# Patient Record
Sex: FEMALE | Race: WHITE | NOT HISPANIC OR LATINO | Employment: FULL TIME | URBAN - METROPOLITAN AREA
[De-identification: names, ages, dates, MRNs, and addresses within clinical notes are randomized per-mention and may not be internally consistent; named-entity substitution may affect disease eponyms.]

---

## 2020-02-14 ENCOUNTER — APPOINTMENT (OUTPATIENT)
Dept: RADIOLOGY | Facility: MEDICAL CENTER | Age: 40
End: 2020-02-14
Attending: EMERGENCY MEDICINE
Payer: COMMERCIAL

## 2020-02-14 ENCOUNTER — HOSPITAL ENCOUNTER (EMERGENCY)
Facility: MEDICAL CENTER | Age: 40
End: 2020-02-14
Attending: EMERGENCY MEDICINE
Payer: COMMERCIAL

## 2020-02-14 VITALS
RESPIRATION RATE: 16 BRPM | DIASTOLIC BLOOD PRESSURE: 79 MMHG | BODY MASS INDEX: 28.6 KG/M2 | HEART RATE: 79 BPM | SYSTOLIC BLOOD PRESSURE: 108 MMHG | WEIGHT: 155.42 LBS | HEIGHT: 62 IN | TEMPERATURE: 98.9 F | OXYGEN SATURATION: 100 %

## 2020-02-14 DIAGNOSIS — S61.217D LACERATION OF LEFT LITTLE FINGER WITHOUT FOREIGN BODY WITHOUT DAMAGE TO NAIL, SUBSEQUENT ENCOUNTER: ICD-10-CM

## 2020-02-14 PROCEDURE — 99283 EMERGENCY DEPT VISIT LOW MDM: CPT

## 2020-02-14 PROCEDURE — 90715 TDAP VACCINE 7 YRS/> IM: CPT | Performed by: EMERGENCY MEDICINE

## 2020-02-14 PROCEDURE — 90471 IMMUNIZATION ADMIN: CPT

## 2020-02-14 PROCEDURE — 304999 HCHG REPAIR-SIMPLE/INTERMED LEVEL 1

## 2020-02-14 PROCEDURE — 303747 HCHG EXTRA SUTURE

## 2020-02-14 PROCEDURE — 73130 X-RAY EXAM OF HAND: CPT | Mod: LT

## 2020-02-14 PROCEDURE — 304217 HCHG IRRIGATION SYSTEM

## 2020-02-14 PROCEDURE — 700111 HCHG RX REV CODE 636 W/ 250 OVERRIDE (IP): Performed by: EMERGENCY MEDICINE

## 2020-02-14 RX ORDER — BUPIVACAINE HYDROCHLORIDE 2.5 MG/ML
10 INJECTION, SOLUTION EPIDURAL; INFILTRATION; INTRACAUDAL ONCE
Status: COMPLETED | OUTPATIENT
Start: 2020-02-14 | End: 2020-02-14

## 2020-02-14 RX ADMIN — BUPIVACAINE HYDROCHLORIDE 10 ML: 2.5 INJECTION, SOLUTION EPIDURAL; INFILTRATION; INTRACAUDAL; PERINEURAL at 15:15

## 2020-02-14 RX ADMIN — CLOSTRIDIUM TETANI TOXOID ANTIGEN (FORMALDEHYDE INACTIVATED), CORYNEBACTERIUM DIPHTHERIAE TOXOID ANTIGEN (FORMALDEHYDE INACTIVATED), BORDETELLA PERTUSSIS TOXOID ANTIGEN (GLUTARALDEHYDE INACTIVATED), BORDETELLA PERTUSSIS FILAMENTOUS HEMAGGLUTININ ANTIGEN (FORMALDEHYDE INACTIVATED), BORDETELLA PERTUSSIS PERTACTIN ANTIGEN, AND BORDETELLA PERTUSSIS FIMBRIAE 2/3 ANTIGEN 0.5 ML: 5; 2; 2.5; 5; 3; 5 INJECTION, SUSPENSION INTRAMUSCULAR at 15:06

## 2020-02-14 NOTE — ED PROVIDER NOTES
ED Provider Note    CHIEF COMPLAINT  Chief Complaint   Patient presents with   • Laceration     pt was doing dishes around 0830 and glass broke lacerating 2 fingers       HPI  Alisha Negrete is a 39 y.o. female who presents for evaluation of accidental laceration to the lateral aspect of her left second and third digits.  The patient was apparently cleaning some dishes at home.  She got glass and excellently cut her fingers.  This was around 4 hours ago.  Her tetanus is not up-to-date.  She is not a diabetic no other complaints.  REVIEW OF SYSTEMS  See HPI for further details.  No numbness weakness tingling fevers chills all other systems are negative.     PAST MEDICAL HISTORY  No past medical history on file.    FAMILY HISTORY  Noncontributory    SOCIAL HISTORY  Social History     Socioeconomic History   • Marital status: Legally      Spouse name: Not on file   • Number of children: Not on file   • Years of education: Not on file   • Highest education level: Not on file   Occupational History   • Not on file   Social Needs   • Financial resource strain: Not on file   • Food insecurity     Worry: Not on file     Inability: Not on file   • Transportation needs     Medical: Not on file     Non-medical: Not on file   Tobacco Use   • Smoking status: Current Every Day Smoker   • Smokeless tobacco: Never Used   Substance and Sexual Activity   • Alcohol use: Yes     Comment: rarely   • Drug use: Yes     Comment: marijuana   • Sexual activity: Not on file   Lifestyle   • Physical activity     Days per week: Not on file     Minutes per session: Not on file   • Stress: Not on file   Relationships   • Social connections     Talks on phone: Not on file     Gets together: Not on file     Attends Orthodoxy service: Not on file     Active member of club or organization: Not on file     Attends meetings of clubs or organizations: Not on file     Relationship status: Not on file   • Intimate partner violence      "Fear of current or ex partner: Not on file     Emotionally abused: Not on file     Physically abused: Not on file     Forced sexual activity: Not on file   Other Topics Concern   • Not on file   Social History Narrative   • Not on file     Denies IV drug  SURGICAL HISTORY  No past surgical history on file.  None reported  CURRENT MEDICATIONS  Home Medications     Reviewed by Corine Gutierrez R.N. (Registered Nurse) on 02/14/20 at 1437  Med List Status: Complete   Medication Last Dose Status        Patient Francis Taking any Medications                       ALLERGIES  No Known Allergies    PHYSICAL EXAM  VITAL SIGNS: /80   Pulse 97   Temp 36.7 °C (98.1 °F) (Temporal)   Resp 18   Ht 1.575 m (5' 2\")   Wt 70.5 kg (155 lb 6.8 oz)   SpO2 98%   BMI 28.43 kg/m²       Constitutional: Well developed, Well nourished, No acute distress, Non-toxic appearance.   HENT: Normocephalic, Atraumatic, Bilateral external ears normal, Oropharynx moist, No oral exudates, Nose normal.   Eyes: PERRLA, EOMI, Conjunctiva normal, No discharge.   Neck: Normal range of motion, No tenderness, Supple, No stridor.   Cardiovascular: Normal heart rate, Normal rhythm, No murmurs, No rubs, No gallops.   Thorax & Lungs: Normal breath sounds, No respiratory distress, No wheezing, No chest tenderness.   Abdomen: Bowel sounds normal, Soft, No tenderness, No masses, No pulsatile masses.   Extremities: Left hand exam is notable for 2 V-shaped lacerations on the fingers second and third.  They are both 1.5 cm V-shaped no exposed tendon distal neurovascular exam and two-point discrimination is normal   neurologic: Alert & oriented x 3, Normal motor function, Normal sensory function, No focal deficits noted.   Psychiatric: Anxious  DX-HAND 3+ LEFT   Final Result      No evidence of acute fracture or dislocation.                  COURSE & MEDICAL DECISION MAKING  Pertinent Labs & Imaging studies reviewed. (See chart for details)  Physician procedure " total of 3 cm of fingertip lacerations.  The wounds were anesthetized with a digital block with a total of 6 cc of 0.25% bupivacaine without epinephrine.  The wounds were copiously irrigated.  Sterile prep and drape.  A total of 5, five-point 0 nylon interrupted sutures were placed no complications.  Sterile dressing applied    Radiograph does not demonstrate any foreign body.  The patient has no suggestion of retained foreign body on exam and no tenderness or vascular injury.  These are relatively clean wounds and she has no comorbidities.  Suture removal in 10 days.    FINAL IMPRESSION  1.  Left second and third fingertip lacerations      Electronically signed by: Elliott Root M.D., 2/14/2020 2:52 PM

## 2020-02-15 NOTE — ED NOTES
Seen by ERP. Pt lac x-ray completed. Lac numbed , irrigated and sutured. Dressing applied. Discharge instructions provided.  Pt verbalized the understanding of discharge instructions to follow up with PCP and to return to ER if condition worsens.  Pt ambulated out of ER without difficulty.

## 2020-02-24 ENCOUNTER — HOSPITAL ENCOUNTER (EMERGENCY)
Facility: MEDICAL CENTER | Age: 40
End: 2020-02-24
Payer: COMMERCIAL

## 2020-02-24 VITALS
WEIGHT: 155 LBS | HEIGHT: 62 IN | DIASTOLIC BLOOD PRESSURE: 84 MMHG | BODY MASS INDEX: 28.52 KG/M2 | OXYGEN SATURATION: 99 % | HEART RATE: 84 BPM | SYSTOLIC BLOOD PRESSURE: 111 MMHG | RESPIRATION RATE: 19 BRPM | TEMPERATURE: 96.8 F

## 2020-02-24 PROCEDURE — 99281 EMR DPT VST MAYX REQ PHY/QHP: CPT

## 2020-02-24 NOTE — ED TRIAGE NOTES
"Chief Complaint   Patient presents with   • Suture Removal     total of 5 sutures removed from 4th and 5th digit that was placed on the 2/14.       One suture on left 4th digit was partially removed. Dr evans assisted in taking suture out.   /84   Pulse 84   Temp 36 °C (96.8 °F) (Temporal)   Resp 19   Ht 1.575 m (5' 2\")   Wt 70.3 kg (155 lb)   SpO2 99%   BMI 28.35 kg/m²     Pt discharged safely.   "

## 2020-05-07 ENCOUNTER — HOSPITAL ENCOUNTER (EMERGENCY)
Facility: MEDICAL CENTER | Age: 40
End: 2020-05-07
Attending: EMERGENCY MEDICINE
Payer: COMMERCIAL

## 2020-05-07 VITALS
HEART RATE: 112 BPM | OXYGEN SATURATION: 97 % | SYSTOLIC BLOOD PRESSURE: 122 MMHG | WEIGHT: 162.92 LBS | TEMPERATURE: 98.8 F | HEIGHT: 65 IN | BODY MASS INDEX: 27.14 KG/M2 | DIASTOLIC BLOOD PRESSURE: 81 MMHG | RESPIRATION RATE: 18 BRPM

## 2020-05-07 DIAGNOSIS — R09.81 NASAL CONGESTION: ICD-10-CM

## 2020-05-07 PROCEDURE — 99282 EMERGENCY DEPT VISIT SF MDM: CPT

## 2020-05-07 PROCEDURE — 99281 EMR DPT VST MAYX REQ PHY/QHP: CPT

## 2020-05-08 NOTE — DISCHARGE INSTRUCTIONS
You were seen in the Emergency Department for nasal congestion likely due to allergies rather than COVID infection.    Please use 1,000mg of tylenol or 600mg of ibuprofen every 6 hours as needed for pain.  Use over-the-counter Claritin or Benadryl as directed.    Please follow up with your primary care physician.    Return to the Emergency Department with fevers greater than 100.4, shortness of breath, or other concerns.    Please show this to your employer.  We are unable to test healthy people for COVID at this time.  As long as your symptoms have resolved you should be good to go back to work.  CDC RECOMMENDATIONS FOR EMPLOYERS  Recommended strategies for employers to use now:  Actively encourage sick employees to stay home:   Employees who have symptoms of acute respiratory illness are recommended to stay home and not come to work until they are free of fever (100.4° F [38.0° C] or greater using an oral thermometer), signs of a fever, and any other symptoms for at least 48 hours, without the use of fever-reducing or other symptom-altering medicines (e.g. cough suppressants). Employees should notify their supervisor and stay home if they are sick.  Ensure that your sick leave policies are flexible and consistent with public health guidance and that employees are aware of these policies.  Talk with companies that provide your business with contract or temporary employees about the importance of sick employees staying home and encourage them to develop non-punitive leave policies.  Do not require a healthcare provider’s note for employees who are sick with acute respiratory illness to validate their illness or to return to work, as healthcare provider offices and medical facilities may be extremely busy and not able to provide such documentation in a timely way.  Employers should maintain flexible policies that permit employees to stay home to care for a sick family member. Employers should be aware that more  employees may need to stay at home to care for sick children or other sick family members than is usual.  Separate sick employees:  CDC recommends that employees who appear to have acute respiratory illness symptoms (i.e. cough, shortness of breath) upon arrival to work or become sick during the day should be  from other employees and be sent home immediately. Sick employees should cover their noses and mouths with a tissue when coughing or sneezing (or an elbow or shoulder if no tissue is available).  Emphasize staying home when sick, respiratory etiquette and hand hygiene by all employees:   Place posters that encourage staying home when sick, cough and sneeze etiquette, and hand hygiene at the entrance to your workplace and in other workplace areas where they are likely to be seen.  Provide tissues and no-touch disposal receptacles for use by employees.  Instruct employees to clean their hands often with an alcohol-based hand  that contains at least 60-95% alcohol, or wash their hands with soap and water for at least 20 seconds. Soap and water should be used preferentially if hands are visibly dirty.  Provide soap and water and alcohol-based hand rubs in the workplace. Ensure that adequate supplies are maintained. Place hand rubs in multiple locations or in conference rooms to encourage hand hygiene.  Visit the coughing and sneezing etiquette and clean hands webpage for more information.  Perform routine environmental cleaning:  Routinely clean all frequently touched surfaces in the workplace, such as workstations, countertops, and doorknobs. Use the cleaning agents that are usually used in these areas and follow the directions on the label.  No additional disinfection beyond routine cleaning is recommended at this time.  Provide disposable wipes so that commonly used surfaces (for example, doorknobs, keyboards, remote controls, desks) can be wiped down by employees before each use.  Advise  employees before traveling to take certain steps:  Check the CDC’s Traveler’s Health Notices for the latest guidance and recommendations for each country to which you will travel. Specific travel information for travelers going to and returning from [place], and information for aircrew, can be found at on the CDC website.  Advise employees to check themselves for symptoms of acute respiratory illness before starting travel and notify their supervisor and stay home if they are sick.  Ensure employees who become sick while traveling or on temporary assignment understand that they should notify their supervisor and should promptly call a healthcare provider for advice if needed.  If outside the [place], sick employees should follow your company’s policy for obtaining medical care or contact a healthcare provider or overseas medical assistance company to assist them with finding an appropriate healthcare provider in that country. A [place]  can help locate healthcare services. However, [place] embassies, consulates, and  facilities do not have the legal authority, capability, and resources to evacuate or give medicines, vaccines, or medical care to private [place] citizens overseas.  Additional Measures in Response to Currently Occurring Sporadic Importations of the COVID-19:  Employees who are well but who have a sick family member at home with COVID-19 should notify their supervisor and refer to CDC guidance for how to conduct a risk assessment of their potential exposure.  If an employee is confirmed to have COVID-19, employers should inform fellow employees of their possible exposure to COVID-19 in the workplace but maintain confidentiality as required by the Americans with Disabilities Act (ADA). Employees exposed to a co-worker with confirmed COVID-19 should refer to CDC guidance for how to conduct a risk assessment of their potential exposure.

## 2020-05-08 NOTE — ED PROVIDER NOTES
"ED Provider Note    CHIEF COMPLAINT  Chief Complaint   Patient presents with   • Nasal Congestion       HPI  Alisha Negrete is a 39 y.o. female who presents with nasal congestion.  The patient is really here to request a work note.  She states she has been suffering from seasonal allergies causing some nasal congestion and needs a work note to return to work due to current COVID outbreak.  She states her symptoms were completely resolved with benadryl yesterday and denies current symptoms.  No recent fever, cough, shortness of breath, chest pain.  She works at [employer] however has not had any other contact with sick people or recent travel.    REVIEW OF SYSTEMS  See HPI for further details.   Positive for nasal congestion  Negative for fevers, cough, chest pain, shortness of breath    PAST MEDICAL HISTORY       SOCIAL HISTORY  Social History     Tobacco Use   • Smoking status: Current Every Day Smoker   • Smokeless tobacco: Never Used   Substance and Sexual Activity   • Alcohol use: Yes     Comment: rarely   • Drug use: Yes     Comment: marijuana   • Sexual activity: Not on file       SURGICAL HISTORY  patient denies any surgical history    CURRENT MEDICATIONS  Home Medications    **Home medications have not yet been reviewed for this encounter**         ALLERGIES  No Known Allergies    PHYSICAL EXAM  VITAL SIGNS: /81   Pulse (!) 112   Temp 37.1 °C (98.8 °F) (Temporal)   Resp 18   Ht 1.651 m (5' 5\")   Wt 73.9 kg (162 lb 14.7 oz)   SpO2 97%   BMI 27.11 kg/m²    Constitutional: Well appearing young female. Alert in no apparent distress.  HENT: Normocephalic, Atraumatic. Bilateral external ears normal. Nose normal. Moist mucous membranes.  Neck: Supple, full range of motion.  Eyes: Pupils are equal and reactive. Conjunctiva normal.   Heart: Regular rate and rhythm. No murmurs.    Lungs: No respiratory distress.  Normal work of breathing.  Clear to auscultation bilaterally.  Abdomen:  Soft, " "no distention. No tenderness to palpation  Skin: Warm, Dry. No rash.   Musculoskeletal: Atraumatic, no deformities noted.   Neurologic: Alert and oriented. Moving all extremities spontaneously  Psychiatric: Affect normal, Mood normal. Appears appropriate and not intoxicated.       DIAGNOSTIC STUDIES        ED COURSE  Vitals:    05/07/20 1708   BP: 122/81   Pulse: (!) 112   Resp: 18   Temp: 37.1 °C (98.8 °F)   TempSrc: Temporal   SpO2: 97%   Weight: 73.9 kg (162 lb 14.7 oz)   Height: 1.651 m (5' 5\")         Medications administered:  Medications - No data to display        MEDICAL DECISION MAKING  Patient presents with request for work note to return to work after she was sent home with nasal congestion with concern for COVID infection.  She is currently asymptomatic with a reassuring exam.  While symptoms do not appear consistent with COVID, patient understands we do not have testing available for healthy discharged patients at this time.  I will provide her with work note based on CDC recommendations for return to work.  Patient understands plan of care and strict return precautions for changing or worsening symptoms.        IMPRESSION  (R09.81) Nasal congestion    Disposition: Discharge home, stable condition  Results, diagnoses, and treatment options were discussed with the patient and/or family. Patient verbalized understanding of plan of care and strict return precautions prior to discharge.    Patient referred to primary care provider for monitoring and treatment of blood pressure.      There are no discharge medications for this patient.        Electronically signed by: Rose Archibald M.D., 5/7/2020 5:29 PM          "

## 2020-05-08 NOTE — ED TRIAGE NOTES
Pt amb to triage c/o nasal congestion r/t allergies. Pt took benadryl 25mg po pta. Pt requesting a work note to return to work tomorrow.

## 2020-05-08 NOTE — ED NOTES
Pt given discharge instructions. RN answered questions. Pt ambulated steadily out to ER [facility].

## 2020-05-08 NOTE — ED NOTES
Pt needs work note saying that she does not have covid and that her symptoms are her seasonal allergies.

## 2020-09-16 ENCOUNTER — HOSPITAL ENCOUNTER (EMERGENCY)
Facility: MEDICAL CENTER | Age: 40
End: 2020-09-16
Attending: EMERGENCY MEDICINE
Payer: COMMERCIAL

## 2020-09-16 ENCOUNTER — APPOINTMENT (OUTPATIENT)
Dept: RADIOLOGY | Facility: MEDICAL CENTER | Age: 40
End: 2020-09-16
Attending: EMERGENCY MEDICINE
Payer: COMMERCIAL

## 2020-09-16 VITALS
OXYGEN SATURATION: 97 % | TEMPERATURE: 98.2 F | BODY MASS INDEX: 29.21 KG/M2 | HEIGHT: 62 IN | SYSTOLIC BLOOD PRESSURE: 123 MMHG | WEIGHT: 158.73 LBS | HEART RATE: 58 BPM | RESPIRATION RATE: 20 BRPM | DIASTOLIC BLOOD PRESSURE: 90 MMHG

## 2020-09-16 DIAGNOSIS — R10.11 RIGHT UPPER QUADRANT ABDOMINAL PAIN: ICD-10-CM

## 2020-09-16 DIAGNOSIS — K80.20 SYMPTOMATIC CHOLELITHIASIS: ICD-10-CM

## 2020-09-16 LAB
ALBUMIN SERPL BCP-MCNC: 4.5 G/DL (ref 3.2–4.9)
ALBUMIN/GLOB SERPL: 2 G/DL
ALP SERPL-CCNC: 42 U/L (ref 30–99)
ALT SERPL-CCNC: 9 U/L (ref 2–50)
ANION GAP SERPL CALC-SCNC: 11 MMOL/L (ref 7–16)
AST SERPL-CCNC: 13 U/L (ref 12–45)
BASOPHILS # BLD AUTO: 0.8 % (ref 0–1.8)
BASOPHILS # BLD: 0.08 K/UL (ref 0–0.12)
BILIRUB SERPL-MCNC: 0.4 MG/DL (ref 0.1–1.5)
BUN SERPL-MCNC: 18 MG/DL (ref 8–22)
CALCIUM SERPL-MCNC: 8.8 MG/DL (ref 8.4–10.2)
CHLORIDE SERPL-SCNC: 106 MMOL/L (ref 96–112)
CO2 SERPL-SCNC: 26 MMOL/L (ref 20–33)
CREAT SERPL-MCNC: 0.47 MG/DL (ref 0.5–1.4)
EOSINOPHIL # BLD AUTO: 0.14 K/UL (ref 0–0.51)
EOSINOPHIL NFR BLD: 1.5 % (ref 0–6.9)
ERYTHROCYTE [DISTWIDTH] IN BLOOD BY AUTOMATED COUNT: 43.7 FL (ref 35.9–50)
GLOBULIN SER CALC-MCNC: 2.3 G/DL (ref 1.9–3.5)
GLUCOSE SERPL-MCNC: 90 MG/DL (ref 65–99)
HCT VFR BLD AUTO: 42.6 % (ref 37–47)
HGB BLD-MCNC: 14 G/DL (ref 12–16)
IMM GRANULOCYTES # BLD AUTO: 0.06 K/UL (ref 0–0.11)
IMM GRANULOCYTES NFR BLD AUTO: 0.6 % (ref 0–0.9)
LIPASE SERPL-CCNC: 36 U/L (ref 7–58)
LYMPHOCYTES # BLD AUTO: 1.88 K/UL (ref 1–4.8)
LYMPHOCYTES NFR BLD: 19.6 % (ref 22–41)
MCH RBC QN AUTO: 29.7 PG (ref 27–33)
MCHC RBC AUTO-ENTMCNC: 32.9 G/DL (ref 33.6–35)
MCV RBC AUTO: 90.4 FL (ref 81.4–97.8)
MONOCYTES # BLD AUTO: 1.23 K/UL (ref 0–0.85)
MONOCYTES NFR BLD AUTO: 12.8 % (ref 0–13.4)
NEUTROPHILS # BLD AUTO: 6.19 K/UL (ref 2–7.15)
NEUTROPHILS NFR BLD: 64.7 % (ref 44–72)
NRBC # BLD AUTO: 0 K/UL
NRBC BLD-RTO: 0 /100 WBC
PLATELET # BLD AUTO: 333 K/UL (ref 164–446)
PMV BLD AUTO: 10.2 FL (ref 9–12.9)
POTASSIUM SERPL-SCNC: 4.3 MMOL/L (ref 3.6–5.5)
PROT SERPL-MCNC: 6.8 G/DL (ref 6–8.2)
RBC # BLD AUTO: 4.71 M/UL (ref 4.2–5.4)
SODIUM SERPL-SCNC: 143 MMOL/L (ref 135–145)
WBC # BLD AUTO: 9.6 K/UL (ref 4.8–10.8)

## 2020-09-16 PROCEDURE — 94760 N-INVAS EAR/PLS OXIMETRY 1: CPT

## 2020-09-16 PROCEDURE — 80053 COMPREHEN METABOLIC PANEL: CPT

## 2020-09-16 PROCEDURE — 85025 COMPLETE CBC W/AUTO DIFF WBC: CPT

## 2020-09-16 PROCEDURE — 96374 THER/PROPH/DIAG INJ IV PUSH: CPT

## 2020-09-16 PROCEDURE — 700111 HCHG RX REV CODE 636 W/ 250 OVERRIDE (IP): Performed by: EMERGENCY MEDICINE

## 2020-09-16 PROCEDURE — 96375 TX/PRO/DX INJ NEW DRUG ADDON: CPT

## 2020-09-16 PROCEDURE — 76705 ECHO EXAM OF ABDOMEN: CPT

## 2020-09-16 PROCEDURE — 99285 EMERGENCY DEPT VISIT HI MDM: CPT

## 2020-09-16 PROCEDURE — 36415 COLL VENOUS BLD VENIPUNCTURE: CPT

## 2020-09-16 PROCEDURE — 83690 ASSAY OF LIPASE: CPT

## 2020-09-16 RX ORDER — ONDANSETRON 2 MG/ML
4 INJECTION INTRAMUSCULAR; INTRAVENOUS ONCE
Status: COMPLETED | OUTPATIENT
Start: 2020-09-16 | End: 2020-09-16

## 2020-09-16 RX ORDER — HYDROCODONE BITARTRATE AND ACETAMINOPHEN 5; 325 MG/1; MG/1
1 TABLET ORAL EVERY 6 HOURS PRN
Qty: 7 TAB | Refills: 0 | Status: SHIPPED | OUTPATIENT
Start: 2020-09-16 | End: 2020-09-19

## 2020-09-16 RX ORDER — MORPHINE SULFATE 4 MG/ML
4 INJECTION, SOLUTION INTRAMUSCULAR; INTRAVENOUS ONCE
Status: COMPLETED | OUTPATIENT
Start: 2020-09-16 | End: 2020-09-16

## 2020-09-16 RX ADMIN — MORPHINE SULFATE 4 MG: 4 INJECTION INTRAVENOUS at 06:03

## 2020-09-16 RX ADMIN — ONDANSETRON HYDROCHLORIDE 4 MG: 2 SOLUTION INTRAMUSCULAR; INTRAVENOUS at 06:03

## 2020-09-16 NOTE — ED TRIAGE NOTES
DX with gallstones a year ago and they were never treated. Per pt she is having increased pain in left upper abdomen.     Negative covid screen.

## 2020-09-16 NOTE — ED PROVIDER NOTES
ED Provider Note        Primary care provider: No primary care provider on file.    I verified that the patient was wearing a mask and I was wearing appropriate PPE every time I entered the room. The patient's mask was on the patient at all times during my encounter except for a brief view of the oropharynx.      CHIEF COMPLAINT  Chief Complaint   Patient presents with   • Abdominal Pain     left upper quad        HPI  Alisha Negrete is a 40 y.o. female who presents to the Emergency Department with chief complaint of abdominal pain.  Patient reports that she was diagnosed with gallstones over a year ago and is been having ongoing issues since.  She reports that over the last several days she has had an tensive sign of this pain she reports the pains in her right upper quadrant and occasionally into the epigastrium.  She denies any vomiting she has had occasional nausea no fevers no chills no headache no altered mental status no shortness of breath no myalgias she has had no diarrhea no constipation no urinary issues no myalgias no sore throat no exposure any known COVID-19 patient's no other acute symptoms or concerns she currently rates her pain as a 10 out of 10 she states is worse with greasy or spicy food she is noted no alleviating factors.    REVIEW OF SYSTEMS  10 systems reviewed and otherwise negative, pertinent positives and negatives listed in the history of present illness.    PAST MEDICAL HISTORY      None    SURGICAL HISTORY  patient denies any surgical history    SOCIAL HISTORY  Social History     Tobacco Use   • Smoking status: Current Every Day Smoker     Packs/day: 2.00     Years: 20.00     Pack years: 40.00     Types: Cigarettes   • Smokeless tobacco: Never Used   Substance Use Topics   • Alcohol use: Yes     Comment: rarely   • Drug use: Yes     Comment: marijuana      Social History     Substance and Sexual Activity   Drug Use Yes    Comment: marijuana       FAMILY  HISTORY  Non-Contributory    CURRENT MEDICATIONS  Home Medications     Reviewed by Magalis Monaco R.N. (Registered Nurse) on 09/16/20 at 0545  Med List Status: <None>   Medication Last Dose Status        Patient Francis Taking any Medications                       ALLERGIES  No Known Allergies    PHYSICAL EXAM  VITAL SIGNS: /90   Pulse 73   LMP 08/26/2020 (Within Weeks)   SpO2 96%   Breastfeeding No   Pulse ox interpretation: I interpret this pulse ox as normal.  Constitutional: Alert and oriented x 3, no acute distress  HEENT: Atraumatic normocephalic, pupils are equal round reactive to light extraocular movements are intact. The nares is clear, external ears are normal, mouth shows moist mucous membranes  Neck: Supple, no JVD no tracheal deviation  Cardiovascular: Regular rate and rhythm no murmur rub or gallop 2+ pulses peripherally x4  Thorax & Lungs: No respiratory distress, no wheezes rales or rhonchi, No chest tenderness.   GI: Normal tenderness to palpation in the right upper quadrant negative Moody's no rebound or guarding positive bowel sounds nondistended  Skin: Warm dry no acute rash or lesion  Musculoskeletal: Moving all extremities with full range and 5 of 5 strength, no acute  deformity  Neurologic: Cranial nerves III through XII are grossly intact, no sensory deficit, no cerebellar dysfunction   Psychiatric: Appropriate affect for situation at this time      DIAGNOSTIC STUDIES / PROCEDURES  LABS      Results for orders placed or performed during the hospital encounter of 09/16/20   CBC WITH DIFFERENTIAL   Result Value Ref Range    WBC 9.6 4.8 - 10.8 K/uL    RBC 4.71 4.20 - 5.40 M/uL    Hemoglobin 14.0 12.0 - 16.0 g/dL    Hematocrit 42.6 37.0 - 47.0 %    MCV 90.4 81.4 - 97.8 fL    MCH 29.7 27.0 - 33.0 pg    MCHC 32.9 (L) 33.6 - 35.0 g/dL    RDW 43.7 35.9 - 50.0 fL    Platelet Count 333 164 - 446 K/uL    MPV 10.2 9.0 - 12.9 fL    Neutrophils-Polys 64.70 44.00 - 72.00 %    Lymphocytes 19.60  (L) 22.00 - 41.00 %    Monocytes 12.80 0.00 - 13.40 %    Eosinophils 1.50 0.00 - 6.90 %    Basophils 0.80 0.00 - 1.80 %    Immature Granulocytes 0.60 0.00 - 0.90 %    Nucleated RBC 0.00 /100 WBC    Neutrophils (Absolute) 6.19 2.00 - 7.15 K/uL    Lymphs (Absolute) 1.88 1.00 - 4.80 K/uL    Monos (Absolute) 1.23 (H) 0.00 - 0.85 K/uL    Eos (Absolute) 0.14 0.00 - 0.51 K/uL    Baso (Absolute) 0.08 0.00 - 0.12 K/uL    Immature Granulocytes (abs) 0.06 0.00 - 0.11 K/uL    NRBC (Absolute) 0.00 K/uL   COMP METABOLIC PANEL   Result Value Ref Range    Sodium 143 135 - 145 mmol/L    Potassium 4.3 3.6 - 5.5 mmol/L    Chloride 106 96 - 112 mmol/L    Co2 26 20 - 33 mmol/L    Anion Gap 11.0 7.0 - 16.0    Glucose 90 65 - 99 mg/dL    Bun 18 8 - 22 mg/dL    Creatinine 0.47 (L) 0.50 - 1.40 mg/dL    Calcium 8.8 8.4 - 10.2 mg/dL    AST(SGOT) 13 12 - 45 U/L    ALT(SGPT) 9 2 - 50 U/L    Alkaline Phosphatase 42 30 - 99 U/L    Total Bilirubin 0.4 0.1 - 1.5 mg/dL    Albumin 4.5 3.2 - 4.9 g/dL    Total Protein 6.8 6.0 - 8.2 g/dL    Globulin 2.3 1.9 - 3.5 g/dL    A-G Ratio 2.0 g/dL   LIPASE   Result Value Ref Range    Lipase 36 7 - 58 U/L   ESTIMATED GFR   Result Value Ref Range    GFR If African American >60 >60 mL/min/1.73 m 2    GFR If Non African American >60 >60 mL/min/1.73 m 2       All labs reviewed by me.      RADIOLOGY  US-RUQ   Final Result      Cholelithiasis without sonographic evidence for acute cholecystitis.        The radiologist's interpretation of all radiological studies have been reviewed by me.    COURSE & MEDICAL DECISION MAKING  Pertinent Labs & Imaging studies reviewed. (See chart for details)    5:49 AM - Patient seen and examined at bedside.         Patient noted to have slightly elevated blood pressure likely circumstantial secondary to presenting complaint. Referred to primary care physician for further evaluation.      Medical Decision Making: Normal white count normal liver function normal bilirubin.  Pains improved  with management here.  There is no further concern for intra-abdominal process aortic process her vital signs are unremarkable repeat abdominal exam is benign ultrasound shows some cholelithiasis without any secondary signs of cholecystitis.  Patient is given small prescription of pain medicine for breakthrough pain given dietary instructions instructed to follow-up with general surgery as an outpatient return here for any worsening pain nausea vomiting fevers chills any other acute symptoms or concerns otherwise discharged in stable and improved condition.    Prescription monitoring program queried and unremarkable.  Patient counseled on the risks of controlled substances including potential risks and benefits proper use alternative treatments, cause the symptoms, provisions of treatment plan, risk of dependence addiction and overdose method safely dispose of the medication, the fact that they would be given no refills from the emergency department    In prescribing controlled substances to this patient, I certify that I have obtained and reviewed the medical history of Alisha Negrete. I have also made a good laurence effort to obtain applicable records from other providers who have treated the patient and records did not demonstrate any increased risk of substance abuse that would prevent me from prescribing controlled substances.     I have conducted a physical exam and documented it. I have reviewed Ms. Negrete’s prescription history as maintained by the [place] Prescription Monitoring Program.     I have assessed the patient’s risk for abuse, dependency, and addiction using the validated Opioid Risk Tool available at https://www.mdcalc.com/kuxwrn-yihy-unpm-ort-narcotic-abuse.     Given the above, I believe the benefits of controlled substance therapy outweigh the risks. The reasons for prescribing controlled substances include non-narcotic, oral analgesic alternatives have been inadequate for pain control.  Accordingly, I have discussed the risk and benefits, treatment plan, and alternative therapies with the patient.         /90   Pulse 73   LMP 08/26/2020 (Within Weeks)   SpO2 96%   Breastfeeding No     Kimmie Evangelista M.D.  [address] [place] [id number] [phone]    Schedule an appointment as soon as possible for a visit       [facility], Emergency Dept  [address]  [place] [id number] [phone]    in 12-24 hours if symptoms persist,, immediately if symptoms worsen      Discharge Medication List as of 9/16/2020  7:10 AM      START taking these medications    Details   HYDROcodone-acetaminophen (NORCO) 5-325 MG Tab per tablet Take 1 Tab by mouth every 6 hours as needed for up to 3 days., Disp-7 Tab,R-0, Print Rx Paper             FINAL IMPRESSION  1. Right upper quadrant abdominal pain Active   2. Symptomatic cholelithiasis Active          This dictation has been created using voice recognition software and/or scribes. The accuracy of the dictation is limited by the abilities of the software and the expertise of the scribes. I expect there may be some errors of grammar and possibly content. I made every attempt to manually correct the errors within my dictation. However, errors related to voice recognition software and/or scribes may still exist and should be interpreted within the appropriate context.

## 2020-09-19 ENCOUNTER — APPOINTMENT (OUTPATIENT)
Dept: RADIOLOGY | Facility: MEDICAL CENTER | Age: 40
End: 2020-09-19
Attending: EMERGENCY MEDICINE
Payer: COMMERCIAL

## 2020-09-19 ENCOUNTER — HOSPITAL ENCOUNTER (EMERGENCY)
Facility: MEDICAL CENTER | Age: 40
End: 2020-09-19
Attending: EMERGENCY MEDICINE
Payer: COMMERCIAL

## 2020-09-19 VITALS
WEIGHT: 162.26 LBS | OXYGEN SATURATION: 99 % | BODY MASS INDEX: 29.86 KG/M2 | RESPIRATION RATE: 15 BRPM | SYSTOLIC BLOOD PRESSURE: 136 MMHG | TEMPERATURE: 97.2 F | HEART RATE: 86 BPM | DIASTOLIC BLOOD PRESSURE: 93 MMHG | HEIGHT: 62 IN

## 2020-09-19 DIAGNOSIS — K80.50 BILIARY COLIC: ICD-10-CM

## 2020-09-19 LAB
ALBUMIN SERPL BCP-MCNC: 4 G/DL (ref 3.2–4.9)
ALBUMIN/GLOB SERPL: 2 G/DL
ALP SERPL-CCNC: 39 U/L (ref 30–99)
ALT SERPL-CCNC: 5 U/L (ref 2–50)
ANION GAP SERPL CALC-SCNC: 9 MMOL/L (ref 7–16)
AST SERPL-CCNC: 10 U/L (ref 12–45)
BASOPHILS # BLD AUTO: 0.8 % (ref 0–1.8)
BASOPHILS # BLD: 0.07 K/UL (ref 0–0.12)
BILIRUB SERPL-MCNC: 0.2 MG/DL (ref 0.1–1.5)
BUN SERPL-MCNC: 12 MG/DL (ref 8–22)
CALCIUM SERPL-MCNC: 8.7 MG/DL (ref 8.4–10.2)
CHLORIDE SERPL-SCNC: 106 MMOL/L (ref 96–112)
CO2 SERPL-SCNC: 24 MMOL/L (ref 20–33)
COVID ORDER STATUS COVID19: NORMAL
CREAT SERPL-MCNC: 0.57 MG/DL (ref 0.5–1.4)
EOSINOPHIL # BLD AUTO: 0.1 K/UL (ref 0–0.51)
EOSINOPHIL NFR BLD: 1.1 % (ref 0–6.9)
ERYTHROCYTE [DISTWIDTH] IN BLOOD BY AUTOMATED COUNT: 45.6 FL (ref 35.9–50)
GLOBULIN SER CALC-MCNC: 2 G/DL (ref 1.9–3.5)
GLUCOSE SERPL-MCNC: 92 MG/DL (ref 65–99)
HCG SERPL QL: NEGATIVE
HCT VFR BLD AUTO: 41.1 % (ref 37–47)
HGB BLD-MCNC: 13.5 G/DL (ref 12–16)
IMM GRANULOCYTES # BLD AUTO: 0.02 K/UL (ref 0–0.11)
IMM GRANULOCYTES NFR BLD AUTO: 0.2 % (ref 0–0.9)
LIPASE SERPL-CCNC: 25 U/L (ref 7–58)
LYMPHOCYTES # BLD AUTO: 1.44 K/UL (ref 1–4.8)
LYMPHOCYTES NFR BLD: 16.2 % (ref 22–41)
MCH RBC QN AUTO: 30.1 PG (ref 27–33)
MCHC RBC AUTO-ENTMCNC: 32.8 G/DL (ref 33.6–35)
MCV RBC AUTO: 91.5 FL (ref 81.4–97.8)
MONOCYTES # BLD AUTO: 1 K/UL (ref 0–0.85)
MONOCYTES NFR BLD AUTO: 11.2 % (ref 0–13.4)
NEUTROPHILS # BLD AUTO: 6.28 K/UL (ref 2–7.15)
NEUTROPHILS NFR BLD: 70.5 % (ref 44–72)
NRBC # BLD AUTO: 0 K/UL
NRBC BLD-RTO: 0 /100 WBC
PLATELET # BLD AUTO: 312 K/UL (ref 164–446)
PMV BLD AUTO: 10.3 FL (ref 9–12.9)
POTASSIUM SERPL-SCNC: 4.5 MMOL/L (ref 3.6–5.5)
PROT SERPL-MCNC: 6 G/DL (ref 6–8.2)
RBC # BLD AUTO: 4.49 M/UL (ref 4.2–5.4)
SARS-COV-2 RNA RESP QL NAA+PROBE: NOTDETECTED
SODIUM SERPL-SCNC: 139 MMOL/L (ref 135–145)
SPECIMEN SOURCE: NORMAL
WBC # BLD AUTO: 8.9 K/UL (ref 4.8–10.8)

## 2020-09-19 PROCEDURE — 99284 EMERGENCY DEPT VISIT MOD MDM: CPT

## 2020-09-19 PROCEDURE — A9270 NON-COVERED ITEM OR SERVICE: HCPCS | Performed by: EMERGENCY MEDICINE

## 2020-09-19 PROCEDURE — C9803 HOPD COVID-19 SPEC COLLECT: HCPCS | Performed by: EMERGENCY MEDICINE

## 2020-09-19 PROCEDURE — 85025 COMPLETE CBC W/AUTO DIFF WBC: CPT

## 2020-09-19 PROCEDURE — 76705 ECHO EXAM OF ABDOMEN: CPT

## 2020-09-19 PROCEDURE — 83690 ASSAY OF LIPASE: CPT

## 2020-09-19 PROCEDURE — 80053 COMPREHEN METABOLIC PANEL: CPT

## 2020-09-19 PROCEDURE — 700102 HCHG RX REV CODE 250 W/ 637 OVERRIDE(OP): Performed by: EMERGENCY MEDICINE

## 2020-09-19 PROCEDURE — 84703 CHORIONIC GONADOTROPIN ASSAY: CPT

## 2020-09-19 PROCEDURE — 36415 COLL VENOUS BLD VENIPUNCTURE: CPT

## 2020-09-19 RX ORDER — HYDROCODONE BITARTRATE AND ACETAMINOPHEN 5; 325 MG/1; MG/1
1 TABLET ORAL EVERY 4 HOURS PRN
Qty: 18 TAB | Refills: 0 | Status: SHIPPED | OUTPATIENT
Start: 2020-09-19 | End: 2020-09-22

## 2020-09-19 RX ORDER — ONDANSETRON 4 MG/1
4 TABLET, ORALLY DISINTEGRATING ORAL ONCE
Qty: 10 TAB | Refills: 0 | Status: SHIPPED | OUTPATIENT
Start: 2020-09-19 | End: 2020-09-19

## 2020-09-19 RX ORDER — HYDROCODONE BITARTRATE AND ACETAMINOPHEN 5; 325 MG/1; MG/1
1 TABLET ORAL ONCE
Status: COMPLETED | OUTPATIENT
Start: 2020-09-19 | End: 2020-09-19

## 2020-09-19 RX ADMIN — HYDROCODONE BITARTRATE AND ACETAMINOPHEN 1 TABLET: 5; 325 TABLET ORAL at 12:31

## 2020-09-19 ASSESSMENT — FIBROSIS 4 INDEX: FIB4 SCORE: 0.49

## 2020-09-19 NOTE — ED NOTES
Pt states that she was seen here on Wednesday for the same. She states that she has a followup appointment scheduled for Monday but states that the pain is so bad she does not feel like she can wait until then

## 2020-09-19 NOTE — ED NOTES
Med rec complete per pt at bedside  Interviewed pt with family at bedside with permission from pt  Allergies reviewed and updated.

## 2020-09-19 NOTE — ED PROVIDER NOTES
"ED Provider Note    CHIEF COMPLAINT  Chief Complaint   Patient presents with   • RUQ Pain     reports recent DX w/ gallstones       HPI  Alisha Negrete is a 40 y.o. female who presents to the emerge department for persistent abdominal pain.  Past medical history significant for chronic recurrent abdominal discomfort.  Seen here 3 days ago and diagnosed with acute cholelithiasis without evidence of cholecystitis or biliary obstruction.  Patient states that she has been using her pain medicine as needed but has continued to eat a very poor diet and not compliant with a diet for biliary complications.  She has not had any fever chills.  No jaundice.  No skin itching.  Pain continues to be worse with food ingestion.  No lower abdominal discomfort.  No change in bowel or bladder.    REVIEW OF SYSTEMS  See HPI for further details. All other systems are negative.     PAST MEDICAL HISTORY       SOCIAL HISTORY  Social History     Tobacco Use   • Smoking status: Current Every Day Smoker     Packs/day: 2.00     Years: 20.00     Pack years: 40.00     Types: Cigarettes   • Smokeless tobacco: Never Used   Substance and Sexual Activity   • Alcohol use: Yes   • Drug use: Yes     Comment: marijuana   • Sexual activity: Not on file       SURGICAL HISTORY   has a past surgical history that includes appendectomy.    CURRENT MEDICATIONS  Home Medications     Reviewed by Mary Kate Irizarry (Pharmacy Tech) on 09/19/20 at 1105  Med List Status: Complete   Medication Last Dose Status   HYDROcodone-acetaminophen (NORCO) 5-325 MG Tab per tablet 9/18/2020 Active                ALLERGIES  No Known Allergies    PHYSICAL EXAM  VITAL SIGNS: /93   Pulse 86   Temp 36.2 °C (97.2 °F) (Temporal)   Resp 15   Ht 1.575 m (5' 2\")   Wt 73.6 kg (162 lb 4.1 oz)   LMP 08/26/2020 (Within Weeks)   SpO2 99%   BMI 29.68 kg/m²  @SKYE[[id number]::@   Pulse ox interpretation: I interpret this pulse ox as normal.  Constitutional: Alert in no " apparent distress.  HENT: No signs of trauma, Bilateral external ears normal, Nose normal.   Eyes: Pupils are equal and reactive, Conjunctiva normal, Non-icteric.   Neck: Normal range of motion, No tenderness, Supple, No stridor.   Cardiovascular: Regular rate and rhythm, no murmurs.   Thorax & Lungs: Normal breath sounds, No respiratory distress, No wheezing, No chest tenderness.   Abdomen: Bowel sounds normal, Soft, midepigastric less than right upper quadrant tenderness, positive Moody's  Skin: Warm, Dry, No erythema, No rash.   Back: No bony tenderness, No CVA tenderness.   Extremities: Intact distal pulses, No edema, No tenderness  Musculoskeletal: Good range of motion in all major joints. No tenderness to palpation or major deformities noted.   Neurologic: Alert , Normal motor function, Normal sensory function, No focal deficits noted.   Psychiatric: Affect normal, Judgment normal, Mood normal.       DIAGNOSTIC STUDIES / PROCEDURES      LABS  Results for orders placed or performed during the hospital encounter of 09/19/20   CBC WITH DIFFERENTIAL   Result Value Ref Range    WBC 8.9 4.8 - 10.8 K/uL    RBC 4.49 4.20 - 5.40 M/uL    Hemoglobin 13.5 12.0 - 16.0 g/dL    Hematocrit 41.1 37.0 - 47.0 %    MCV 91.5 81.4 - 97.8 fL    MCH 30.1 27.0 - 33.0 pg    MCHC 32.8 (L) 33.6 - 35.0 g/dL    RDW 45.6 35.9 - 50.0 fL    Platelet Count 312 164 - 446 K/uL    MPV 10.3 9.0 - 12.9 fL    Neutrophils-Polys 70.50 44.00 - 72.00 %    Lymphocytes 16.20 (L) 22.00 - 41.00 %    Monocytes 11.20 0.00 - 13.40 %    Eosinophils 1.10 0.00 - 6.90 %    Basophils 0.80 0.00 - 1.80 %    Immature Granulocytes 0.20 0.00 - 0.90 %    Nucleated RBC 0.00 /100 WBC    Neutrophils (Absolute) 6.28 2.00 - 7.15 K/uL    Lymphs (Absolute) 1.44 1.00 - 4.80 K/uL    Monos (Absolute) 1.00 (H) 0.00 - 0.85 K/uL    Eos (Absolute) 0.10 0.00 - 0.51 K/uL    Baso (Absolute) 0.07 0.00 - 0.12 K/uL    Immature Granulocytes (abs) 0.02 0.00 - 0.11 K/uL    NRBC (Absolute)  0.00 K/uL   LIPASE   Result Value Ref Range    Lipase 25 7 - 58 U/L   COMP METABOLIC PANEL   Result Value Ref Range    Sodium 139 135 - 145 mmol/L    Potassium 4.5 3.6 - 5.5 mmol/L    Chloride 106 96 - 112 mmol/L    Co2 24 20 - 33 mmol/L    Anion Gap 9.0 7.0 - 16.0    Glucose 92 65 - 99 mg/dL    Bun 12 8 - 22 mg/dL    Creatinine 0.57 0.50 - 1.40 mg/dL    Calcium 8.7 8.4 - 10.2 mg/dL    AST(SGOT) 10 (L) 12 - 45 U/L    ALT(SGPT) 5 2 - 50 U/L    Alkaline Phosphatase 39 30 - 99 U/L    Total Bilirubin 0.2 0.1 - 1.5 mg/dL    Albumin 4.0 3.2 - 4.9 g/dL    Total Protein 6.0 6.0 - 8.2 g/dL    Globulin 2.0 1.9 - 3.5 g/dL    A-G Ratio 2.0 g/dL   HCG Qual Serum   Result Value Ref Range    Beta-Hcg Qualitative Serum Negative Negative   COVID/SARS CoV-2 PCR    Specimen: Nasopharyngeal; Respirate   Result Value Ref Range    COVID Order Status Received    SARS-CoV-2, PCR (In-House)   Result Value Ref Range    SARS-CoV-2 Source NP Swab     SARS-CoV-2 by PCR NotDetected    ESTIMATED GFR   Result Value Ref Range    GFR If African American >60 >60 mL/min/1.73 m 2    GFR If Non African American >60 >60 mL/min/1.73 m 2         RADIOLOGY  US-RUQ   Final Result         1. Cholelithiasis. No evidence of cholecystitis.   2. Otherwise unremarkable ultrasound.          1300: Discussed case with Dr. Galvin on-call for general surgery.  Explained he can see the patient as an outpatient in the office.  Preop screening was negative for COVID.    COURSE & MEDICAL DECISION MAKING  Pertinent Labs & Imaging studies reviewed. (See chart for details)  32-year-old female presenting back to the emerge department with what sounds like a recurrent biliary colic.  Recurrent evaluation today does not show any elevation her white count does not show any acute evidence of transition from cholelithiasis or Daniela lithiasis with cholecystitis or other biliary obstruction.  I have stressed multiple times throughout her observation that she must comply with a diet  as discussed.  She has been given refill on her narcotic pain medication and also given Zofran for further antiemetic needs.  She has been provided with a work note for today but I do believe she will be able to return to work tomorrow.  Again she is also understanding of calling the surgical office on Monday for scheduling..       The patient will return for worsening symptoms and is stable at the time of discharge. The patient verbalizes understanding and will comply.    FINAL IMPRESSION  1. Biliary colic            Electronically signed by: Elliott Rome M.D., 9/19/2020 10:30 AM

## 2020-09-19 NOTE — ED NOTES
1040:  PIV placed in RAC, blood drawn and sent to lab.   Covid culture collected and sent to lab.  Pt updated on POC including pending US, denied questions/concerns at this time.

## 2020-09-19 NOTE — ED NOTES
Pt given discharge instructions. RN answered questions. VSS. Pt ambulated steadily out to [facility].

## 2020-09-19 NOTE — Clinical Note
Alisha Negrete was seen and treated in our emergency department on 9/19/2020.  She may return to work on 09/20/2020.       If you have any questions or concerns, please don't hesitate to call.      Elliott Rome M.D.

## 2020-09-19 NOTE — ED TRIAGE NOTES
"Chief Complaint   Patient presents with   • RUQ Pain     reports recent DX w/ gallstones     /88   Pulse 90   Temp 36.2 °C (97.2 °F) (Temporal)   Resp 15   Ht 1.575 m (5' 2\")   Wt 73.6 kg (162 lb 4.1 oz)   LMP 08/26/2020 (Within Weeks)   SpO2 99%   BMI 29.68 kg/m²     Pt reports has an appointment on Monday w/ PCP, reports continued pain in RUQ.     Covid Screen Negative.      "

## 2021-07-25 ENCOUNTER — APPOINTMENT (OUTPATIENT)
Dept: RADIOLOGY | Facility: MEDICAL CENTER | Age: 41
End: 2021-07-25
Attending: EMERGENCY MEDICINE

## 2021-07-25 ENCOUNTER — HOSPITAL ENCOUNTER (EMERGENCY)
Facility: MEDICAL CENTER | Age: 41
End: 2021-07-25
Attending: EMERGENCY MEDICINE

## 2021-07-25 VITALS
OXYGEN SATURATION: 98 % | SYSTOLIC BLOOD PRESSURE: 117 MMHG | WEIGHT: 164.9 LBS | HEART RATE: 78 BPM | HEIGHT: 62 IN | BODY MASS INDEX: 30.35 KG/M2 | DIASTOLIC BLOOD PRESSURE: 73 MMHG | RESPIRATION RATE: 15 BRPM | TEMPERATURE: 98.3 F

## 2021-07-25 DIAGNOSIS — K80.20 CALCULUS OF GALLBLADDER WITHOUT CHOLECYSTITIS WITHOUT OBSTRUCTION: ICD-10-CM

## 2021-07-25 LAB
ALBUMIN SERPL BCP-MCNC: 4 G/DL (ref 3.2–4.9)
ALBUMIN/GLOB SERPL: 1.8 G/DL
ALP SERPL-CCNC: 44 U/L (ref 30–99)
ALT SERPL-CCNC: 9 U/L (ref 2–50)
ANION GAP SERPL CALC-SCNC: 7 MMOL/L (ref 7–16)
AST SERPL-CCNC: 11 U/L (ref 12–45)
BASOPHILS # BLD AUTO: 0.7 % (ref 0–1.8)
BASOPHILS # BLD: 0.07 K/UL (ref 0–0.12)
BILIRUB SERPL-MCNC: 0.3 MG/DL (ref 0.1–1.5)
BUN SERPL-MCNC: 15 MG/DL (ref 8–22)
CALCIUM SERPL-MCNC: 8.9 MG/DL (ref 8.4–10.2)
CHLORIDE SERPL-SCNC: 104 MMOL/L (ref 96–112)
CO2 SERPL-SCNC: 24 MMOL/L (ref 20–33)
CREAT SERPL-MCNC: 0.53 MG/DL (ref 0.5–1.4)
EKG IMPRESSION: NORMAL
EOSINOPHIL # BLD AUTO: 0.13 K/UL (ref 0–0.51)
EOSINOPHIL NFR BLD: 1.4 % (ref 0–6.9)
ERYTHROCYTE [DISTWIDTH] IN BLOOD BY AUTOMATED COUNT: 45 FL (ref 35.9–50)
GLOBULIN SER CALC-MCNC: 2.2 G/DL (ref 1.9–3.5)
GLUCOSE SERPL-MCNC: 116 MG/DL (ref 65–99)
HCG SERPL QL: NEGATIVE
HCT VFR BLD AUTO: 40.6 % (ref 37–47)
HGB BLD-MCNC: 13.5 G/DL (ref 12–16)
IMM GRANULOCYTES # BLD AUTO: 0.06 K/UL (ref 0–0.11)
IMM GRANULOCYTES NFR BLD AUTO: 0.6 % (ref 0–0.9)
LIPASE SERPL-CCNC: 48 U/L (ref 7–58)
LYMPHOCYTES # BLD AUTO: 1.83 K/UL (ref 1–4.8)
LYMPHOCYTES NFR BLD: 19.6 % (ref 22–41)
MCH RBC QN AUTO: 30.5 PG (ref 27–33)
MCHC RBC AUTO-ENTMCNC: 33.3 G/DL (ref 33.6–35)
MCV RBC AUTO: 91.6 FL (ref 81.4–97.8)
MONOCYTES # BLD AUTO: 1.08 K/UL (ref 0–0.85)
MONOCYTES NFR BLD AUTO: 11.6 % (ref 0–13.4)
NEUTROPHILS # BLD AUTO: 6.18 K/UL (ref 2–7.15)
NEUTROPHILS NFR BLD: 66.1 % (ref 44–72)
NRBC # BLD AUTO: 0 K/UL
NRBC BLD-RTO: 0 /100 WBC
PLATELET # BLD AUTO: 343 K/UL (ref 164–446)
PMV BLD AUTO: 10.1 FL (ref 9–12.9)
POTASSIUM SERPL-SCNC: 3.8 MMOL/L (ref 3.6–5.5)
PROT SERPL-MCNC: 6.2 G/DL (ref 6–8.2)
RBC # BLD AUTO: 4.43 M/UL (ref 4.2–5.4)
SODIUM SERPL-SCNC: 135 MMOL/L (ref 135–145)
WBC # BLD AUTO: 9.4 K/UL (ref 4.8–10.8)

## 2021-07-25 PROCEDURE — 80053 COMPREHEN METABOLIC PANEL: CPT

## 2021-07-25 PROCEDURE — 85025 COMPLETE CBC W/AUTO DIFF WBC: CPT

## 2021-07-25 PROCEDURE — 93005 ELECTROCARDIOGRAM TRACING: CPT | Performed by: EMERGENCY MEDICINE

## 2021-07-25 PROCEDURE — A9270 NON-COVERED ITEM OR SERVICE: HCPCS | Performed by: EMERGENCY MEDICINE

## 2021-07-25 PROCEDURE — 83690 ASSAY OF LIPASE: CPT

## 2021-07-25 PROCEDURE — 84703 CHORIONIC GONADOTROPIN ASSAY: CPT

## 2021-07-25 PROCEDURE — 99283 EMERGENCY DEPT VISIT LOW MDM: CPT

## 2021-07-25 PROCEDURE — 700102 HCHG RX REV CODE 250 W/ 637 OVERRIDE(OP): Performed by: EMERGENCY MEDICINE

## 2021-07-25 PROCEDURE — 76705 ECHO EXAM OF ABDOMEN: CPT

## 2021-07-25 RX ORDER — ALUMINA, MAGNESIA, AND SIMETHICONE 2400; 2400; 240 MG/30ML; MG/30ML; MG/30ML
20 SUSPENSION ORAL ONCE
Status: COMPLETED | OUTPATIENT
Start: 2021-07-25 | End: 2021-07-25

## 2021-07-25 RX ADMIN — ALUMINUM HYDROXIDE, MAGNESIUM HYDROXIDE, AND DIMETHICONE 20 ML: 400; 400; 40 SUSPENSION ORAL at 20:50

## 2021-07-25 ASSESSMENT — FIBROSIS 4 INDEX: FIB4 SCORE: 0.56

## 2021-07-25 NOTE — Clinical Note
Alisha Negrete was seen and treated in our emergency department on 7/25/2021.  She may return to work on 07/26/2021.       If you have any questions or concerns, please don't hesitate to call.      Elliott Rome M.D.

## 2021-07-26 NOTE — ED PROVIDER NOTES
ED Provider Note    CHIEF COMPLAINT  Chief Complaint   Patient presents with   • Epigastric Pain     Patient report of epigastric pain for 6 months and diagnosed with gallstones.  Unable to see a surgeon.   • Difficulty Breathing     Patient report of shortness of breath for a month.  Speaks in full sentences       [facility] [place]  Alisha Negrete is a 41 y.o. female who presents to the emergency room complaining of epigastric bowel discomfort. Past medical history significant for known gallstones. She notes that she was seen here at the end of last year for similar presentation. She was unable to follow-up with surgeon due to financial and insurance constraints. She notes that she recently got a new job but is still currently within the jose period and not yet insured through her new employer. She has however had recurrent abdominal discomfort with eating. This being similar to prior. No fever chills. No jaundice.    Patient did also endorse some shortness of breath to triage nursing. She explains on questioning that this is her baseline shortness of breath which she believes is likely related to her ongoing smoking. No acute changes. No chest pain.    REVIEW OF SYSTEMS  See HPI for further details. All other systems are negative.     PAST MEDICAL HISTORY   has a past medical history of Patient denies medical problems.    SOCIAL HISTORY  Social History     Tobacco Use   • Smoking status: Current Every Day Smoker     Packs/day: 2.00     Years: 20.00     Pack years: 40.00     Types: Cigarettes   • Smokeless tobacco: Never Used   Vaping Use   • Vaping Use: Never used   Substance and Sexual Activity   • Alcohol use: Yes   • Drug use: Yes     Comment: marijuana   • Sexual activity: Not on file       SURGICAL HISTORY   has a past surgical history that includes appendectomy.    CURRENT MEDICATIONS  Home Medications     Reviewed by Ashley Pineda R.N. (Registered Nurse) on 07/25/21 at 1902  Med List Status: Complete  "  Medication Last Dose Status        Patient Francis Taking any Medications                       ALLERGIES  No Known Allergies    PHYSICAL EXAM  VITAL SIGNS: /73   Pulse 78   Temp 36.8 °C (98.3 °F) (Tympanic)   Resp 15   Ht 1.575 m (5' 2\")   Wt 74.8 kg (164 lb 14.5 oz)   LMP 07/15/2021   SpO2 98%   BMI 30.16 kg/m²  @SKYE[[id number]::@   Pulse ox interpretation: I interpret this pulse ox as normal.  Constitutional: Alert in no apparent distress. Appears older than stated age  HENT: No signs of trauma, Bilateral external ears normal, Nose normal.   Eyes: Pupils are equal and reactive  Neck: Normal range of motion, No tenderness, Supple  Cardiovascular: Regular rate and rhythm, no murmurs.   Thorax & Lungs: Normal breath sounds, No respiratory distress, No wheezing, No chest tenderness.   Abdomen: Bowel sounds normal, Soft, minimal mid epigastric tenderness  Skin: Warm, Dry, No erythema, No rash.   Extremities: Intact distal pulses  Musculoskeletal: Good range of motion in all major joints. No tenderness to palpation or major deformities noted.   Neurologic: Alert , Normal motor function, Normal sensory function, No focal deficits noted.   Psychiatric: Affect normal, Judgment normal, Mood normal.       DIAGNOSTIC STUDIES / PROCEDURES    LABS  Results for orders placed or performed during the hospital encounter of 07/25/21   CBC WITH DIFFERENTIAL   Result Value Ref Range    WBC 9.4 4.8 - 10.8 K/uL    RBC 4.43 4.20 - 5.40 M/uL    Hemoglobin 13.5 12.0 - 16.0 g/dL    Hematocrit 40.6 37.0 - 47.0 %    MCV 91.6 81.4 - 97.8 fL    MCH 30.5 27.0 - 33.0 pg    MCHC 33.3 (L) 33.6 - 35.0 g/dL    RDW 45.0 35.9 - 50.0 fL    Platelet Count 343 164 - 446 K/uL    MPV 10.1 9.0 - 12.9 fL    Neutrophils-Polys 66.10 44.00 - 72.00 %    Lymphocytes 19.60 (L) 22.00 - 41.00 %    Monocytes 11.60 0.00 - 13.40 %    Eosinophils 1.40 0.00 - 6.90 %    Basophils 0.70 0.00 - 1.80 %    Immature Granulocytes 0.60 0.00 - 0.90 %    Nucleated RBC " 0.00 /100 WBC    Neutrophils (Absolute) 6.18 2.00 - 7.15 K/uL    Lymphs (Absolute) 1.83 1.00 - 4.80 K/uL    Monos (Absolute) 1.08 (H) 0.00 - 0.85 K/uL    Eos (Absolute) 0.13 0.00 - 0.51 K/uL    Baso (Absolute) 0.07 0.00 - 0.12 K/uL    Immature Granulocytes (abs) 0.06 0.00 - 0.11 K/uL    NRBC (Absolute) 0.00 K/uL   COMP METABOLIC PANEL   Result Value Ref Range    Sodium 135 135 - 145 mmol/L    Potassium 3.8 3.6 - 5.5 mmol/L    Chloride 104 96 - 112 mmol/L    Co2 24 20 - 33 mmol/L    Anion Gap 7.0 7.0 - 16.0    Glucose 116 (H) 65 - 99 mg/dL    Bun 15 8 - 22 mg/dL    Creatinine 0.53 0.50 - 1.40 mg/dL    Calcium 8.9 8.4 - 10.2 mg/dL    AST(SGOT) 11 (L) 12 - 45 U/L    ALT(SGPT) 9 2 - 50 U/L    Alkaline Phosphatase 44 30 - 99 U/L    Total Bilirubin 0.3 0.1 - 1.5 mg/dL    Albumin 4.0 3.2 - 4.9 g/dL    Total Protein 6.2 6.0 - 8.2 g/dL    Globulin 2.2 1.9 - 3.5 g/dL    A-G Ratio 1.8 g/dL   LIPASE   Result Value Ref Range    Lipase 48 7 - 58 U/L   HCG QUAL SERUM   Result Value Ref Range    Beta-Hcg Qualitative Serum Negative Negative   ESTIMATED GFR   Result Value Ref Range    GFR If African American >60 >60 mL/min/1.73 m 2    GFR If Non African American >60 >60 mL/min/1.73 m 2   EKG   Result Value Ref Range    Report       [facility] Emergency Dept.    Test Date:  2021  Pt Name:    MEETA ESTRADA             Department: EDS  MRN:        [id number]                      [facility]  Gender:     Female                       Technician: VENITA  :        1980                   Requested By:ELLIOTT SCHMIDT  Order #:    [id number]                    Reading MD: Elliott Schmidt    Measurements  Intervals                                Axis  Rate:       76                           P:          21  MT:         140                          QRS:        9  QRSD:       92                           T:          27  QT:         388  QTc:        437    Interpretive Statements  SINUS RHYTHM  No  previous ECG available for comparison  Electronically Signed On 7- 20:48:56 PDT by Elliott Rome           RADIOLOGY  US-RUQ   Final Result      1.  Cholelithiasis. No cholecystitis.   2.  Otherwise unremarkable ultrasound.              COURSE & MEDICAL DECISION MAKING  Pertinent Labs & Imaging studies reviewed. (See chart for details)  41-year-old female presented emergency departments with complaint of recurrent abdominal pain. Known gallbladder disease. History as above. Workup negative and again showing cholelithiasis without evidence of acute cholecystitis or any other complicating factors. I do believe the patient is primarily coming in for a work note which has been provided. She is again been provided with referral to general surgery for follow-up on transference is established.      The patient will return for worsening symptoms and is stable at the time of discharge. The patient verbalizes understanding and will comply.    FINAL IMPRESSION  1. Calculus of gallbladder without cholecystitis without obstruction            Electronically signed by: Elliott Rome M.D., 7/25/2021 8:07 PM

## 2021-07-26 NOTE — ED NOTES
Patient discharged home in stable condition  AVS provided with recommended follow up and home care instructions and education information  No prescriptions provided at this time  Work excuse provided  Patient verbalized understanding  Ambulatory at time of discharge

## 2021-07-26 NOTE — ED TRIAGE NOTES
"41 yr old female to triage  Chief Complaint   Patient presents with   • Epigastric Pain     Patient report of epigastric pain for 6 months and diagnosed with gallstones.  Unable to see a surgeon.   • Difficulty Breathing     Patient report of shortness of breath for a month.  Speaks in full sentences     Pulse 90   Temp 36.8 °C (98.3 °F) (Tympanic)   Resp 16   Ht 1.575 m (5' 2\")   Wt 74.8 kg (164 lb 14.5 oz)   LMP 07/15/2021   SpO2 99%   BMI 30.16 kg/m²     "

## 2023-09-20 NOTE — ED NOTES
Pt to the restroom to attempt to collect a UA   No Residual Tumor Seen Histology Text: There were no malignant cells seen in the sections examined.

## 2024-06-15 ENCOUNTER — APPOINTMENT (OUTPATIENT)
Dept: RADIOLOGY | Facility: MEDICAL CENTER | Age: 44
End: 2024-06-15
Attending: EMERGENCY MEDICINE
Payer: COMMERCIAL

## 2024-06-15 ENCOUNTER — HOSPITAL ENCOUNTER (EMERGENCY)
Facility: MEDICAL CENTER | Age: 44
End: 2024-06-15
Attending: EMERGENCY MEDICINE
Payer: COMMERCIAL

## 2024-06-15 VITALS
SYSTOLIC BLOOD PRESSURE: 117 MMHG | WEIGHT: 163.8 LBS | HEIGHT: 62 IN | HEART RATE: 77 BPM | BODY MASS INDEX: 30.14 KG/M2 | TEMPERATURE: 97.5 F | OXYGEN SATURATION: 94 % | RESPIRATION RATE: 20 BRPM | DIASTOLIC BLOOD PRESSURE: 79 MMHG

## 2024-06-15 DIAGNOSIS — R07.9 CHEST PAIN, UNSPECIFIED TYPE: ICD-10-CM

## 2024-06-15 DIAGNOSIS — K21.9 GASTROESOPHAGEAL REFLUX DISEASE, UNSPECIFIED WHETHER ESOPHAGITIS PRESENT: ICD-10-CM

## 2024-06-15 LAB
ALBUMIN SERPL BCP-MCNC: 4.2 G/DL (ref 3.2–4.9)
ALBUMIN/GLOB SERPL: 1.7 G/DL
ALP SERPL-CCNC: 51 U/L (ref 30–99)
ALT SERPL-CCNC: 11 U/L (ref 2–50)
ANION GAP SERPL CALC-SCNC: 13 MMOL/L (ref 7–16)
AST SERPL-CCNC: 13 U/L (ref 12–45)
BASOPHILS # BLD AUTO: 0.9 % (ref 0–1.8)
BASOPHILS # BLD: 0.08 K/UL (ref 0–0.12)
BILIRUB SERPL-MCNC: 0.5 MG/DL (ref 0.1–1.5)
BUN SERPL-MCNC: 11 MG/DL (ref 8–22)
CALCIUM ALBUM COR SERPL-MCNC: 8.7 MG/DL (ref 8.5–10.5)
CALCIUM SERPL-MCNC: 8.9 MG/DL (ref 8.4–10.2)
CHLORIDE SERPL-SCNC: 106 MMOL/L (ref 96–112)
CO2 SERPL-SCNC: 22 MMOL/L (ref 20–33)
CREAT SERPL-MCNC: 0.64 MG/DL (ref 0.5–1.4)
D DIMER PPP IA.FEU-MCNC: <0.27 UG/ML (FEU) (ref 0–0.5)
EKG IMPRESSION: NORMAL
EOSINOPHIL # BLD AUTO: 0.11 K/UL (ref 0–0.51)
EOSINOPHIL NFR BLD: 1.3 % (ref 0–6.9)
ERYTHROCYTE [DISTWIDTH] IN BLOOD BY AUTOMATED COUNT: 42.5 FL (ref 35.9–50)
GFR SERPLBLD CREATININE-BSD FMLA CKD-EPI: 113 ML/MIN/1.73 M 2
GLOBULIN SER CALC-MCNC: 2.5 G/DL (ref 1.9–3.5)
GLUCOSE SERPL-MCNC: 92 MG/DL (ref 65–99)
HCG SERPL QL: NEGATIVE
HCT VFR BLD AUTO: 41.3 % (ref 37–47)
HGB BLD-MCNC: 14.4 G/DL (ref 12–16)
IMM GRANULOCYTES # BLD AUTO: 0.04 K/UL (ref 0–0.11)
IMM GRANULOCYTES NFR BLD AUTO: 0.5 % (ref 0–0.9)
LYMPHOCYTES # BLD AUTO: 1.6 K/UL (ref 1–4.8)
LYMPHOCYTES NFR BLD: 18.2 % (ref 22–41)
MCH RBC QN AUTO: 30.6 PG (ref 27–33)
MCHC RBC AUTO-ENTMCNC: 34.9 G/DL (ref 32.2–35.5)
MCV RBC AUTO: 87.9 FL (ref 81.4–97.8)
MONOCYTES # BLD AUTO: 0.9 K/UL (ref 0–0.85)
MONOCYTES NFR BLD AUTO: 10.3 % (ref 0–13.4)
NEUTROPHILS # BLD AUTO: 6.05 K/UL (ref 1.82–7.42)
NEUTROPHILS NFR BLD: 68.8 % (ref 44–72)
NRBC # BLD AUTO: 0 K/UL
NRBC BLD-RTO: 0 /100 WBC (ref 0–0.2)
PLATELET # BLD AUTO: 407 K/UL (ref 164–446)
PMV BLD AUTO: 10.2 FL (ref 9–12.9)
POTASSIUM SERPL-SCNC: 4 MMOL/L (ref 3.6–5.5)
PROT SERPL-MCNC: 6.7 G/DL (ref 6–8.2)
RBC # BLD AUTO: 4.7 M/UL (ref 4.2–5.4)
SODIUM SERPL-SCNC: 141 MMOL/L (ref 135–145)
TROPONIN T SERPL-MCNC: <6 NG/L (ref 6–19)
WBC # BLD AUTO: 8.8 K/UL (ref 4.8–10.8)

## 2024-06-15 PROCEDURE — 36415 COLL VENOUS BLD VENIPUNCTURE: CPT

## 2024-06-15 PROCEDURE — 84484 ASSAY OF TROPONIN QUANT: CPT

## 2024-06-15 PROCEDURE — 85379 FIBRIN DEGRADATION QUANT: CPT

## 2024-06-15 PROCEDURE — 93005 ELECTROCARDIOGRAM TRACING: CPT | Performed by: EMERGENCY MEDICINE

## 2024-06-15 PROCEDURE — 85025 COMPLETE CBC W/AUTO DIFF WBC: CPT

## 2024-06-15 PROCEDURE — 99284 EMERGENCY DEPT VISIT MOD MDM: CPT

## 2024-06-15 PROCEDURE — 700102 HCHG RX REV CODE 250 W/ 637 OVERRIDE(OP): Performed by: EMERGENCY MEDICINE

## 2024-06-15 PROCEDURE — 93005 ELECTROCARDIOGRAM TRACING: CPT

## 2024-06-15 PROCEDURE — 84703 CHORIONIC GONADOTROPIN ASSAY: CPT

## 2024-06-15 PROCEDURE — 71045 X-RAY EXAM CHEST 1 VIEW: CPT

## 2024-06-15 PROCEDURE — 80053 COMPREHEN METABOLIC PANEL: CPT

## 2024-06-15 PROCEDURE — A9270 NON-COVERED ITEM OR SERVICE: HCPCS | Performed by: EMERGENCY MEDICINE

## 2024-06-15 RX ORDER — OMEPRAZOLE 40 MG/1
40 CAPSULE, DELAYED RELEASE ORAL DAILY
Qty: 30 CAPSULE | Refills: 3 | Status: SHIPPED | OUTPATIENT
Start: 2024-06-15

## 2024-06-15 RX ADMIN — LIDOCAINE HYDROCHLORIDE 30 ML: 20 SOLUTION ORAL; TOPICAL at 16:19

## 2024-06-15 ASSESSMENT — HEART SCORE
RISK FACTORS: 1-2 RISK FACTORS
HISTORY: SLIGHTLY SUSPICIOUS
AGE: <45
HEART SCORE: 1
ECG: NORMAL
TROPONIN: LESS THAN OR EQUAL TO NORMAL LIMIT

## 2024-06-15 NOTE — ED TRIAGE NOTES
"Chief Complaint   Patient presents with    Chest Pain     L sided x 3mo, states \"I can't take the pain anymore\". Reports also \"a little bit\" of dizziness. Denies SOB.      Physical Exam  Pulmonary:      Effort: Pulmonary effort is normal.   Skin:     General: Skin is warm and dry.   Neurological:      Mental Status: She is alert.         "

## 2024-06-15 NOTE — Clinical Note
Alisha Negrete was seen and treated in our emergency department on 6/15/2024.  She may return to work on 06/18/2024.       If you have any questions or concerns, please don't hesitate to call.      Rose Archibald M.D.

## 2024-06-15 NOTE — ED PROVIDER NOTES
"ED Provider Note    CHIEF COMPLAINT  Chief Complaint   Patient presents with    Chest Pain     L sided x 3mo, states \"I can't take the pain anymore\". Reports also \"a little bit\" of dizziness. Denies SOB.      EXTERNAL RECORDS REVIEWED  Patient was last seen December 2021 for laparoscopic cholecystectomy    HPI/ROS  LIMITATION TO HISTORY   Select: : None  OUTSIDE HISTORIAN(S):  None    Alisha Negrete is a 43 y.o. female who presents to the Emergency Department with chest pain.  Patient reports intermittent chest pain that is been going on for the last 3 months.  She states she just recently obtained insurance from her job which is why she came in today.  She describes sharp stabbing pain in the middle to left chest.  Episodes last about 5 to 10 minutes usually.  She does not have any pain at this time.  Pain is slightly worse with inspiration however no significant shortness of breath.  No nausea, vomiting, abdominal pain, leg swelling.  Patient does use tobacco.  Not currently on any medications.    PAST MEDICAL HISTORY  Past Medical History:   Diagnosis Date    Patient denies medical problems         SURGICAL HISTORY  Past Surgical History:   Procedure Laterality Date    APPENDECTOMY          FAMILY HISTORY  No family history on file.    SOCIAL HISTORY   reports that she has been smoking cigarettes. She has a 40 pack-year smoking history. She has never used smokeless tobacco. She reports current alcohol use. She reports current drug use.    CURRENT MEDICATIONS  Discharge Medication List as of 6/15/2024  5:22 PM          ALLERGIES  Patient has no known allergies.    PHYSICAL EXAM  /79   Pulse 77   Temp 36.4 °C (97.5 °F) (Temporal)   Resp 20   Ht 1.575 m (5' 2\")   Wt 74.3 kg (163 lb 12.8 oz)   SpO2 94%      Constitutional: Nontoxic appearing. Alert in no apparent distress.  HENT: Normocephalic, Atraumatic. Bilateral external ears normal. Nose normal.  Moist mucous membranes.  Oropharynx " clear.  Eyes: Pupils are equal and reactive. Conjunctiva normal.   Neck: Supple, full range of motion  Heart: Regular rate and rhythm.  No murmurs.    Lungs: No respiratory distress, normal work of breathing. Lungs clear to auscultation bilaterally.  Abdomen Soft, no distention.  No tenderness to palpation.  Musculoskeletal: Atraumatic. No obvious deformities noted.  No lower extremity edema.  Skin: Warm, Dry.  No erythema, No rash.   Neurologic: Alert and oriented x3. Moving all extremities spontaneously without focal deficits.  Psychiatric: Affect normal, Mood normal, Appears appropriate and not intoxicated.    DIAGNOSTIC STUDIES / PROCEDURES    EKG  I have independently interpreted this EKG  Results for orders placed or performed during the hospital encounter of 06/15/24   EKG   Result Value Ref Range    Report       [facility] Emergency Dept.    Test Date:  2024-06-15  Pt Name:    MEETA ESTRADA             Department: [facility]  MRN:        [id number]                      Room:  Gender:     Female                       Technician: [id number]  :        1980                   Requested By:ER TRIAGE PROTOCOL  Order #:    [id number]                    Reading MD: Rose Archibald MD    Measurements  Intervals                                Axis  Rate:       89                           P:          56  KY:         124                          QRS:        13  QRSD:       90                           T:          20  QT:         363  QTc:        442    Interpretive Statements  Sinus rhythm  Normal intervals, no ectopy  No ST or T wave change  Compared to ECG 2021 19:46:41  No significant changes  Electronically Signed On 06- 16:11:21 PDT by Rose Archibald MD         LABS  Labs Reviewed   CBC WITH DIFFERENTIAL - Abnormal; Notable for the following components:       Result Value    Lymphocytes 18.20 (*)     Monos (Absolute) 0.90 (*)     All other components within normal limits   COMP  METABOLIC PANEL   TROPONIN   HCG QUAL SERUM   D-DIMER   ESTIMATED GFR         RADIOLOGY  I have independently interpreted the diagnostic imaging associated with this visit and am waiting the final reading from the radiologist.   My preliminary interpretation is as follows: no infiltrate    Radiologist interpretation:  DX-CHEST-PORTABLE (1 VIEW)   Final Result      No acute cardiopulmonary abnormality identified.            COURSE & MEDICAL DECISION MAKING      ASSESSMENT, COURSE AND PLAN  Care Narrative:  Relatively healthy patient with history of tobacco use who presents with intermittent chest pain which has been going on for the last 3 months.  She is mildly tachycardic on arrival with otherwise reassuring vital signs.  Her EKG does not show evidence of ischemia or arrhythmia.  Troponin is completely normal making ACS unlikely.  History is also atypical for cardiac etiology.  D-dimer is negative making risk for pulmonary embolism very low.  Chest x-ray does not show pneumonia, pulmonary edema, pneumothorax.  Labs are otherwise reassuring without leukocytosis, renal dysfunction, electrolyte abnormality.  Symptoms seem as though they may be related with GERD.    5:08 PM - Upon reassessment, patient is resting comfortably with normal vital signs.  No new complaints at this time.  Discussed results with patient and/or family as well as importance of primary care/cardiology follow up.  Patient understands plan of care and strict return precautions for new or changing symptoms.       ADDITIONAL PROBLEM LIST  Problem #1: Acute chest pain -  HEART score 1, referral for cardiology placed for outpatient stress test    Problem #2: GERD - start omeprazole daily, given diet recommendations    DISPOSITION AND DISCUSSIONS  Escalation of care considered, and ultimately not performed:acute inpatient care management, however at this time, the patient is most appropriate for outpatient management    Barriers to care at this time,  including but not limited to: Patient does not have established PCP.       DISPOSITION:  Patient will be discharged home in stable condition.    FOLLOW UP:  [facility]  [address] [place]  [place] [address]  [phone]  Call   to establish primary care physician    [facility]  [address]  [place] [id number] [phone]  Schedule an appointment as soon as possible for a visit   cardiology follow up    [facility] Emergency Dept  [address]  [place] [id number] [phone]    If symptoms worsen      OUTPATIENT MEDICATIONS:  Discharge Medication List as of 6/15/2024  5:22 PM        START taking these medications    Details   omeprazole (PRILOSEC) 40 MG delayed-release capsule Take 1 Capsule by mouth every day., Disp-30 Capsule, R-3, Normal               FINAL DIAGNOSIS  1. Chest pain, unspecified type    2. Gastroesophageal reflux disease, unspecified whether esophagitis present

## 2024-06-15 NOTE — ED NOTES
Pharmacy Medication Reconciliation    ~Medication Reconciliation updated and complete per patient at bedside   ~Allergies reviewed            ~Patient reports NO Prescription or OTC medications

## 2024-06-17 ENCOUNTER — PATIENT OUTREACH (OUTPATIENT)
Dept: SCHEDULING | Facility: IMAGING CENTER | Age: 44
End: 2024-06-17
Payer: COMMERCIAL

## 2024-06-18 ENCOUNTER — PATIENT OUTREACH (OUTPATIENT)
Dept: SCHEDULING | Facility: IMAGING CENTER | Age: 44
End: 2024-06-18
Payer: COMMERCIAL

## 2024-06-21 ENCOUNTER — TELEPHONE (OUTPATIENT)
Dept: HEALTH INFORMATION MANAGEMENT | Facility: OTHER | Age: 44
End: 2024-06-21
Payer: COMMERCIAL

## 2024-09-30 ENCOUNTER — HOSPITAL ENCOUNTER (EMERGENCY)
Facility: MEDICAL CENTER | Age: 44
End: 2024-09-30
Attending: EMERGENCY MEDICINE
Payer: COMMERCIAL

## 2024-09-30 VITALS
OXYGEN SATURATION: 95 % | WEIGHT: 157.63 LBS | RESPIRATION RATE: 16 BRPM | DIASTOLIC BLOOD PRESSURE: 91 MMHG | TEMPERATURE: 98.4 F | HEART RATE: 84 BPM | HEIGHT: 62 IN | BODY MASS INDEX: 29.01 KG/M2 | SYSTOLIC BLOOD PRESSURE: 141 MMHG

## 2024-09-30 DIAGNOSIS — S49.92XA INJURY OF LEFT SHOULDER, INITIAL ENCOUNTER: ICD-10-CM

## 2024-09-30 PROCEDURE — 99281 EMR DPT VST MAYX REQ PHY/QHP: CPT

## 2024-09-30 RX ORDER — LIDOCAINE 4 G/G
1 PATCH TOPICAL EVERY 24 HOURS
Qty: 30 PATCH | Refills: 0 | Status: SHIPPED | OUTPATIENT
Start: 2024-09-30

## 2024-09-30 ASSESSMENT — FIBROSIS 4 INDEX: FIB4 SCORE: 0.4

## 2025-06-06 ENCOUNTER — APPOINTMENT (OUTPATIENT)
Dept: RADIOLOGY | Facility: MEDICAL CENTER | Age: 45
End: 2025-06-06
Attending: EMERGENCY MEDICINE
Payer: COMMERCIAL

## 2025-06-06 ENCOUNTER — HOSPITAL ENCOUNTER (EMERGENCY)
Facility: MEDICAL CENTER | Age: 45
End: 2025-06-06
Attending: EMERGENCY MEDICINE
Payer: COMMERCIAL

## 2025-06-06 VITALS
WEIGHT: 150.35 LBS | DIASTOLIC BLOOD PRESSURE: 74 MMHG | HEIGHT: 62 IN | HEART RATE: 90 BPM | RESPIRATION RATE: 18 BRPM | BODY MASS INDEX: 27.67 KG/M2 | SYSTOLIC BLOOD PRESSURE: 138 MMHG | TEMPERATURE: 97.3 F | OXYGEN SATURATION: 98 %
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
## 2025-06-06 DIAGNOSIS — S89.92XA KNEE INJURY, LEFT, INITIAL ENCOUNTER: ICD-10-CM

## 2025-06-06 PROCEDURE — 700102 HCHG RX REV CODE 250 W/ 637 OVERRIDE(OP): Performed by: EMERGENCY MEDICINE

## 2025-06-06 PROCEDURE — 73564 X-RAY EXAM KNEE 4 OR MORE: CPT | Mod: LT

## 2025-06-06 PROCEDURE — A9270 NON-COVERED ITEM OR SERVICE: HCPCS | Performed by: EMERGENCY MEDICINE

## 2025-06-06 PROCEDURE — 99284 EMERGENCY DEPT VISIT MOD MDM: CPT

## 2025-06-06 RX ORDER — IBUPROFEN 600 MG/1
600 TABLET, FILM COATED ORAL ONCE
Status: COMPLETED | OUTPATIENT
Start: 2025-06-06 | End: 2025-06-06

## 2025-06-06 RX ORDER — ACETAMINOPHEN 325 MG/1
650 TABLET ORAL ONCE
Status: COMPLETED | OUTPATIENT
Start: 2025-06-06 | End: 2025-06-06

## 2025-06-06 RX ADMIN — IBUPROFEN 600 MG: 600 TABLET, FILM COATED ORAL at 13:47

## 2025-06-06 RX ADMIN — ACETAMINOPHEN 650 MG: 325 TABLET ORAL at 13:47

## 2025-06-06 ASSESSMENT — FIBROSIS 4 INDEX: FIB4 SCORE: 0.4

## 2025-06-06 NOTE — LETTER
"  EMPLOYEE’S CLAIM FOR COMPENSATION/ REPORT OF INITIAL TREATMENT  FORM C-4  PLEASE TYPE OR PRINT    EMPLOYEE’S CLAIM - PROVIDE ALL INFORMATION REQUESTED   First Name                    LARON Brito                  Last Name  Penelope Birthdate                    1980                Sex  [x]Female Claim Number (Insurer’s Use Only)     Mailing Address  GENERAL DELIVERY Age  44 y.o. 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 (if applicable)  Helppin get shoppin carts    How did this injury or occupational disease occur? (Be specific and answer in detail. Use additional sheet if necessary)  I was helppin get cart. Tripped on curB wheR ther was a Tree and Rock. and Fell/ Left [place]   If you believe that you have an occupational disease, when did you first have knowledge of the disability and its relationship to your employment?  n/a Witnesses to the Accident (if applicable)  n/a      Nature of Injury or Occupational Disease  Workers' Compensation  Part(s) of Body Injured or Affected  Knee (L) N/A N/A    I CERTIFY THAT THE ABOVE IS TRUE AND " CORRECT TO T HE BEST OF MY KNOWLEDGE AND THAT I HAVE PROVIDED THIS INFORMATION IN ORDER TO OBTAIN THE BENEFITS OF [place], OR CHAPTER [place]).  I HEREBY AUTHORIZE ANY PHYSICIAN, CHIROPRACTOR, SURGEON, PRACTITIONER OR ANY OTHER PERSON, ANY HOSPITAL, INCLUDING [facility] OR [facility], ANY  MEDICAL SERVICE ORGANIZATION, ANY INSURANCE COMPANY, OR OTHER INSTITUTION OR ORGANIZATION TO RELEASE TO EACH OTHER, ANY MEDICAL OR OTHER INFORMATION, INCLUDING BENEFITS PAID OR PAYABLE, PERTINENT TO THIS INJURY OR DISEASE, EXCEPT INFORMATION RELATIVE TO DIAGNOSIS, TREATMENT AND/OR COUNSELING FOR AIDS, PSYCHOLOGICAL CONDITIONS, ALCOHOL OR CONTROLLED SUBSTANCES, FOR WHICH I MUST GIVE SPECIFIC AUTHORIZATION.  A PHOTOSTAT OF THIS AUTHORIZATION SHALL BE VALID AS THE ORIGINAL.     Date 06/06/25   Place [facility] Employee’s Original or  *Electronic Signature   THIS REPORT MUST BE COMPLETED AND MAILED WITHIN 3 WORKING DAYS OF TREATMENT   Place  [facility], EMERGENCY DEPT    Name of Facility   ER   Date 6/6/2025 Diagnosis and Description of Injury or Occupational Disease  (W19.XXXA) Fall, initial encounter  (primary encounter diagnosis)  (S89.92XA) Knee injury, left, initial encounter  The primary encounter diagnosis was Fall, initial encounter. A diagnosis of Knee injury, left, initial encounter was also pertinent to this visit. Is there evidence that the injured employee was under the influence of alcohol and/or another controlled substance at the time of accident?  [x]No  [] Yes (if yes, please explain)   Hour  14:19   No   Treatment: Xray knee, tylenol and motrin ace wrap and crutches    Have you advised the patient to remain off work five days or more?   No  [] Yes  If yes, indicate dates: From_6/6/2025_                                                      To __6/8/2025_  [x] No   If no, is the injured  employee capable of: [] full duty No   [x] modified duty No    If modified duty, specify any limitations / restrictions:__________________  ___No standing or lifting___________________________     X-Ray Findings: Negative    From information given by the employee, together with medical evidence, can you directly connect this injury or occupational disease as job incurred?  [x]Yes   [] No Yes    Is additional medical care by a physician indicated? [x]Yes [] No  Yes  Comments:occ health    Do you know of any previous injury or disease contributing to this condition or occupational disease? []Yes [x] No (Explain if yes)                          No   Date  6/6/2025 Print Health Care Provider’s Name  Mera Stefania I certify that the employer’s copy of  this form was delivered to the employer on:  6/6/25   Address   [address] INSURER'S USE ONLY                       [facility]  Zip  [address]  Provider’s Tax ID Number  [id number]    Telephone  Dept: [phone]    Health Care Provider’s Original or Electronic Signature      e-STEFANIA Brito M.D.    Degree (MD,DO, DC,PA-C,APRN)  MD  Choose (if applicable)      ORIGINAL - TREATING HEALTHCARE PROVIDER PAGE 2 - INSURER/TPA PAGE 3 - EMPLOYER PAGE 4 - EMPLOYEE             Form C-4 (rev.02/25)

## 2025-06-06 NOTE — LETTER
"  EMPLOYEE’S CLAIM FOR COMPENSATION/ REPORT OF INITIAL TREATMENT  FORM C-4  PLEASE TYPE OR PRINT    EMPLOYEE’S CLAIM - PROVIDE ALL INFORMATION REQUESTED   First Name                    LARON Brito                  Last Name  Penelope Birthdate                    1980                Sex  [x]Female Claim Number (Insurer’s Use Only)     Mailing Address  GENERAL DELIVERY Age  44 y.o. Height  1.575 m (5' 2\") Weight  68.2 kg (150 lb 5.7 oz) Social Security Number     [facility] Zip  [address] Telephone  There are no phone numbers on file.   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 (if applicable)  Helppin get shoppin carts    How did this injury or occupational disease occur? (Be specific and answer in detail. Use additional sheet if necessary)  I was helppin get cart. Tripped on curB wheR ther was a [employer]. and Fell/ Left K[place]   If you believe that you have an occupational disease, when did you first have knowledge of the disability and its relationship to your employment?  n/a Witnesses to the Accident (if applicable)  n/a      Nature of Injury or Occupational Disease  Workers' Compensation  Part(s) of Body Injured or Affected  Knee (L) N/A N/A    I CERTIFY THAT " THE ABOVE IS TRUE AND CORRECT TO T HE BEST OF MY KNOWLEDGE AND THAT I HAVE PROVIDED THIS INFORMATION IN ORDER TO OBTAIN THE BENEFITS OF [place], OR [place]  I HEREBY AUTHORIZE ANY PHYSICIAN, CHIROPRACTOR, SURGEON, PRACTITIONER OR ANY OTHER PERSON, ANY HOSPITAL, INCLUDING [facility] OR [facility], ANY  MEDICAL SERVICE ORGANIZATION, ANY INSURANCE COMPANY, OR OTHER INSTITUTION OR ORGANIZATION TO RELEASE TO EACH OTHER, ANY MEDICAL OR OTHER INFORMATION, INCLUDING BENEFITS PAID OR PAYABLE, PERTINENT TO THIS INJURY OR DISEASE, EXCEPT INFORMATION RELATIVE TO DIAGNOSIS, TREATMENT AND/OR COUNSELING FOR AIDS, PSYCHOLOGICAL CONDITIONS, ALCOHOL OR CONTROLLED SUBSTANCES, FOR WHICH I MUST GIVE SPECIFIC AUTHORIZATION.  A PHOTOSTAT OF THIS AUTHORIZATION SHALL BE VALID AS THE ORIGINAL.     Date  06/06/2025   Place [facility] Employee’s Original or  *Electronic Signature   THIS REPORT MUST BE COMPLETED AND MAILED WITHIN 3 WORKING DAYS OF TREATMENT   Place  [facility], EMERGENCY DEPT    Name of Facility   ER   Date 6/6/2025 Diagnosis and Description of Injury or Occupational Disease  (W19.XXXA) Fall, initial encounter  (primary encounter diagnosis)  (S89.92XA) Knee injury, left, initial encounter  The primary encounter diagnosis was Fall, initial encounter. A diagnosis of Knee injury, left, initial encounter was also pertinent to this visit. Is there evidence that the injured employee was under the influence of alcohol and/or another controlled substance at the time of accident?  [x]No  [] Yes (if yes, please explain)   Hour  14:08   No   Treatment: Xray knee, tylenol and motrin ace wrap and crutches    Have you advised the patient to remain off work five days or more?   No  [] Yes  If yes, indicate dates: From_6/6/2025_                                                      To __6/8/2025_  [x] No   If  no, is the injured employee capable of: [] full duty No   [x] modified duty No    If modified duty, specify any limitations / restrictions:__________________  ___No standing or lifting___________________________     X-Ray Findings: Negative    From information given by the employee, together with medical evidence, can you directly connect this injury or occupational disease as job incurred?  [x]Yes   [] No Yes    Is additional medical care by a physician indicated? [x]Yes [] No  Yes  Comments:occ health    Do you know of any previous injury or disease contributing to this condition or occupational disease? []Yes [x] No (Explain if yes)                          No   Date  6/6/2025 Print Health Care Provider’s Name  Stefania Tesfaye I certify that the employer’s copy of  this form was delivered to the employer on:   Address   [address] INSURER'S USE ONLY                       Located within [facility]  Zip  [address]  Provider’s Tax ID Number  [id number]    Telephone  Dept: [phone]    Health Care Provider’s Original or Electronic Signature      e-STEFANIA Brito M.D.    Degree (MD,DO, DC,PA-C,APRN)  MD  Choose (if applicable)      ORIGINAL - TREATING HEALTHCARE PROVIDER PAGE 2 - INSURER/TPA PAGE 3 - EMPLOYER PAGE 4 - EMPLOYEE             Form C-4 (rev.02/25)

## 2025-06-06 NOTE — ED TRIAGE NOTES
"Chief Complaint   Patient presents with    Knee Pain     Tripped, fell, hurt her left knee at work yesterday. Denies swelling but is TTP.      Blood Pressure: (!) 142/85, Pulse: 97, Respiration: 18, Temperature: 37 °C (98.6 °F), Height: 157.5 cm (5' 2\"), Weight: 68.2 kg (150 lb 5.7 oz), BMI (Calculated): 27.5, BSA (Calculated): 1.7, Pulse Oximetry: 97 %, O2 Delivery Device: None - Room Air  "

## 2025-06-06 NOTE — ED PROVIDER NOTES
ED Provider Note    CHIEF COMPLAINT  Chief Complaint   Patient presents with    Knee Pain     Tripped, fell, hurt her left knee at work yesterday. Denies swelling but is TTP.        EXTERNAL RECORDS REVIEWED  Inpatient Notes patient was seen by Dr. Arrington for an outpatient cholecystectomy 12/1/2021    HPI/ROS  LIMITATION TO HISTORY   Select: : None  OUTSIDE HISTORIAN(S):  none    Alisha Negrete is a 44 y.o. female who presents with pain and bruising to her left knee after falling yesterday while leaving shopping carts at her work.  She hit her right knee and is having a lot of pain.  She can walk but states it hurts a lot.  She denies any history of previous knee injury or knee surgeries.  She denies any numbness or tingling distally she denies any pain in her left hip or ankle.    PAST MEDICAL HISTORY   has a past medical history of Patient denies medical problems.    SURGICAL HISTORY   has a past surgical history that includes appendectomy.    FAMILY HISTORY  History reviewed. No pertinent family history.    SOCIAL HISTORY  Social History     Tobacco Use    Smoking status: Every Day     Current packs/day: 2.00     Average packs/day: 2.0 packs/day for 20.0 years (40.0 ttl pk-yrs)     Types: Cigarettes    Smokeless tobacco: Never   Vaping Use    Vaping status: Never Used   Substance and Sexual Activity    Alcohol use: Not Currently    Drug use: Yes     Types: Inhaled     Comment: the marijuana    Sexual activity: Not on file       CURRENT MEDICATIONS  Home Medications       Reviewed by Austin Oneal R.N. (Registered Nurse) on 06/06/25 at 1218  Med List Status: Not Addressed     Medication Last Dose Status   lidocaine (ASPERFLEX) 4 % Patch  Active   omeprazole (PRILOSEC) 40 MG delayed-release capsule  Active                  Audit from Redirected Encounters    **Home medications have not yet been reviewed for this encounter**         ALLERGIES  Allergies[1]    PHYSICAL EXAM  VITAL SIGNS: BP (!) 142/85    "Pulse 97   Temp 37 °C (98.6 °F) (Temporal)   Resp 18   Ht 1.575 m (5' 2\")   Wt 68.2 kg (150 lb 5.7 oz)   LMP 06/01/2025 Comment: \"can't get pregnant. boyfriend's fixed\"  SpO2 97%   BMI 27.50 kg/m²      Constitutional: No distress  Skin: Contusion to the left knee just lateral to the patella  Musculoskeletal: Full range of motion of the patient's left knee with a contusion to the left lateral knee no bony step-offs or crepitance no ligamentous instability  Vascular: warm to touch good capillary refill   Neurologic: distally neurovascularly intact  Psychiatric: Affect normal        EKG/LABS  none  I have independently interpreted this EKG    RADIOLOGY/PROCEDURES   I have independently interpreted the diagnostic imaging associated with this visit and am waiting the final reading from the radiologist.   My preliminary interpretation is as follows: xray knee fracture or dislocation    Radiologist interpretation:  DX-KNEE COMPLETE 4+ LEFT   Final Result      No radiographic evidence of acute traumatic injury.          COURSE & MEDICAL DECISION MAKING    ASSESSMENT, COURSE AND PLAN  Care Narrative:   Alisha Negrete is a 44 y.o. female who presents with pain and bruising to her left knee after falling yesterday while leaving shopping carts at her work.  She hit her right knee and is having a lot of pain.  She can walk but states it hurts a lot.  She denies any history of previous knee injury or knee surgeries.  She denies any numbness or tingling distally she denies any pain in her left hip or ankle.              ADDITIONAL PROBLEMS MANAGED      DISPOSITION AND DISCUSSIONS    The left knee shows fracture dislocation no effusion.  I will discharge the patient home with an Ace wrap and crutches I advised her to rest ice and elevate her knee and perform gentle range of motion exercises several times daily.  She should continue to take Tylenol Motrin as needed for pain and follow-up with the patient will health " if it is not improving.      I have discussed management of the patient with the following physicians and ALTAGRACIA's:  none    Discussion of management with other [facility] or appropriate source(s): None     Escalation of care considered, and ultimately not performed: none    Barriers to care at this time, including but not limited to: Patient does not have established PCP.     Decision tools and prescription drugs considered including, but not limited to: Pain Medications Lanelle and Motrin.      The patient will return for new or worsening symptoms and is stable at the time of discharge.    The patient is referred to a primary physician for blood pressure management, diabetic screening, and for all other preventative health concerns.        DISPOSITION:  Patient will be discharged home in stable condition.    FOLLOW UP:  [address]  [facility] [address] [phone]  Call in 1 week  for recheck      OUTPATIENT MEDICATIONS:  New Prescriptions    No medications on file   over the counter Tylenol and Motrin    FINAL DIAGNOSIS  1. Fall, initial encounter    2. Knee injury, left, initial encounter         Electronically signed by: Tati Tesfaye M.D., 6/6/2025 1:31 PM           [1] No Known Allergies

## 2025-06-06 NOTE — ED NOTES
D/c pt home, no rx given . Pt aware of f/u instructions , with [facility].  aware to return for any changes or concerns. No further questions upon d/c home from ed

## 2025-06-09 NOTE — Clinical Note
REFERRAL APPROVAL NOTICE         Sent on June 9, 2025                   Alisha Negrete  [facility] [address]                   Dear Ms. Negrete,    After a careful review of the medical information and benefit coverage, [facility] has processed your referral. See below for additional details.    If applicable, you must be actively enrolled with your insurance for coverage of the authorized service. If you have any questions regarding your coverage, please contact your insurance directly.    REFERRAL INFORMATION   Referral #:  [id number]  Referred-To Department    Referred-By Provider:  Occupational Medicine    Tati Tesfaye M.D.   n [address] Emergency Room  [address]  [facility] [address] [phone] [address] [facility]  [address]  [facility] [id number] [phone]    Referral Start Date:  06/06/2025  Referral End Date:   06/06/2026             SCHEDULING  If you do not already have an appointment, please call [phone] to make an appointment.     MORE INFORMATION  If you do not already have a [employer] account, sign up at: [email or web address]  You can access your medical information, make appointments, see lab results, billing information, and more.  If you have questions regarding this referral, please contact  the [facility] Referrals department at:             [phone]. Monday - Friday 8:00AM - 5:00PM.     Sincerely,    [facility]

## 2025-06-10 ENCOUNTER — TELEPHONE (OUTPATIENT)
Dept: OCCUPATIONAL MEDICINE | Facility: CLINIC | Age: 45
End: 2025-06-10
Payer: COMMERCIAL

## 2025-06-24 ENCOUNTER — TELEPHONE (OUTPATIENT)
Dept: OCCUPATIONAL MEDICINE | Facility: CLINIC | Age: 45
End: 2025-06-24
Payer: COMMERCIAL